# Patient Record
Sex: MALE | Race: WHITE | NOT HISPANIC OR LATINO | Employment: FULL TIME | URBAN - NONMETROPOLITAN AREA
[De-identification: names, ages, dates, MRNs, and addresses within clinical notes are randomized per-mention and may not be internally consistent; named-entity substitution may affect disease eponyms.]

---

## 2021-11-11 ENCOUNTER — NURSE TRIAGE (OUTPATIENT)
Dept: CALL CENTER | Facility: HOSPITAL | Age: 51
End: 2021-11-11

## 2021-11-11 NOTE — TELEPHONE ENCOUNTER
"Fell from a ladder, hurt left leg. Will be going to     Reason for Disposition  • [1] SEVERE pain (e.g. excruciating)  AND [2] not improved 2 hours after pain medicine/ice packs    Additional Information  • Negative: Serious injury with multiple fractures (broken bones)  • Negative: [1] Major bleeding (e.g., actively dripping or spurting) AND [2] can't be stopped  • Negative: Bullet wound, stabbed by knife, or other serious penetrating wound  • Negative: Looks like a dislocated joint (crooked or deformed)  • Negative: Can't stand (bear weight) or walk  • Negative: Sounds like a life-threatening emergency to the triager  • Negative: Wound looks infected  • Negative: Leg pain from overuse (e.g., sports, running, physical work)  • Negative: Leg pain not from an injury  • Negative: Injury mainly to the hip  • Negative: Injury mainly to knee  • Negative: Injury mainly to foot or ankle  • Negative: Skin is split open or gaping (or length > 1/2 inch or 12 mm)  • Negative: [1] Bleeding AND [2] won't stop after 10 minutes of direct pressure (using correct technique)  • Negative: [1] Dirt in the wound AND [2] not removed with 15 minutes of scrubbing  • Negative: Sounds like a serious injury to the triager    Answer Assessment - Initial Assessment Questions  1. MECHANISM: \"How did the injury happen?\" (e.g., twisting injury, direct blow)    fell from ladder hurt left knee and leg  2. ONSET: \"When did the injury happen?\" (Minutes or hours ago)       3 day  3. LOCATION: \"Where is the injury located?\"       Left knee and ankle  4. APPEARANCE of INJURY: \"What does the injury look like?\"  (e.g., deformity of leg)      no  5. SEVERITY: \"Can you put weight on that leg?\" \"Can you walk?\"       yes  6. SIZE: For cuts, bruises, or swelling, ask: \"How large is it?\" (e.g., inches or centimeters)       brusing  7. PAIN: \"Is there pain?\" If Yes, ask: \"How bad is the pain?\"  (Scale 1-10; or mild, moderate, severe)      moderate  8. TETANUS: " "For any breaks in the skin, ask: \"When was the last tetanus booster?\"      na  9. OTHER SYMPTOMS: \"Do you have any other symptoms?\"       na  10. PREGNANCY: \"Is there any chance you are pregnant?\" \"When was your last menstrual period?\"        na    Protocols used: LEG INJURY-ADULT-AH      "

## 2021-11-17 ENCOUNTER — LAB (OUTPATIENT)
Dept: LAB | Facility: HOSPITAL | Age: 51
End: 2021-11-17

## 2021-11-17 ENCOUNTER — OFFICE VISIT (OUTPATIENT)
Dept: FAMILY MEDICINE CLINIC | Facility: CLINIC | Age: 51
End: 2021-11-17

## 2021-11-17 VITALS
DIASTOLIC BLOOD PRESSURE: 78 MMHG | WEIGHT: 241 LBS | HEART RATE: 94 BPM | BODY MASS INDEX: 28.46 KG/M2 | OXYGEN SATURATION: 97 % | SYSTOLIC BLOOD PRESSURE: 132 MMHG | HEIGHT: 77 IN

## 2021-11-17 DIAGNOSIS — F32.4 MAJOR DEPRESSIVE DISORDER WITH SINGLE EPISODE, IN PARTIAL REMISSION (HCC): ICD-10-CM

## 2021-11-17 DIAGNOSIS — Z12.5 PROSTATE CANCER SCREENING: ICD-10-CM

## 2021-11-17 DIAGNOSIS — Z11.59 ENCOUNTER FOR HEPATITIS C SCREENING TEST FOR LOW RISK PATIENT: ICD-10-CM

## 2021-11-17 DIAGNOSIS — R73.03 PREDIABETES: ICD-10-CM

## 2021-11-17 DIAGNOSIS — N52.9 ERECTILE DYSFUNCTION, UNSPECIFIED ERECTILE DYSFUNCTION TYPE: ICD-10-CM

## 2021-11-17 DIAGNOSIS — N52.8 OTHER MALE ERECTILE DYSFUNCTION: ICD-10-CM

## 2021-11-17 DIAGNOSIS — I10 PRIMARY HYPERTENSION: ICD-10-CM

## 2021-11-17 DIAGNOSIS — R73.03 PREDIABETES: Primary | ICD-10-CM

## 2021-11-17 DIAGNOSIS — R05.3 CHRONIC COUGH: ICD-10-CM

## 2021-11-17 DIAGNOSIS — R06.81 APNEA: ICD-10-CM

## 2021-11-17 DIAGNOSIS — E78.5 DYSLIPIDEMIA: ICD-10-CM

## 2021-11-17 PROBLEM — E66.01 MORBID (SEVERE) OBESITY DUE TO EXCESS CALORIES: Status: ACTIVE | Noted: 2021-11-17

## 2021-11-17 LAB
BASOPHILS # BLD AUTO: 0.08 10*3/MM3 (ref 0–0.2)
BASOPHILS NFR BLD AUTO: 1.1 % (ref 0–1.5)
DEPRECATED RDW RBC AUTO: 38.3 FL (ref 37–54)
EOSINOPHIL # BLD AUTO: 0.19 10*3/MM3 (ref 0–0.4)
EOSINOPHIL NFR BLD AUTO: 2.5 % (ref 0.3–6.2)
ERYTHROCYTE [DISTWIDTH] IN BLOOD BY AUTOMATED COUNT: 13 % (ref 12.3–15.4)
HCT VFR BLD AUTO: 45.2 % (ref 37.5–51)
HGB BLD-MCNC: 16.3 G/DL (ref 13–17.7)
LYMPHOCYTES # BLD AUTO: 2.43 10*3/MM3 (ref 0.7–3.1)
LYMPHOCYTES NFR BLD AUTO: 32.4 % (ref 19.6–45.3)
MCH RBC QN AUTO: 30 PG (ref 26.6–33)
MCHC RBC AUTO-ENTMCNC: 36.1 G/DL (ref 31.5–35.7)
MCV RBC AUTO: 83.1 FL (ref 79–97)
MONOCYTES # BLD AUTO: 0.77 10*3/MM3 (ref 0.1–0.9)
MONOCYTES NFR BLD AUTO: 10.3 % (ref 5–12)
NEUTROPHILS NFR BLD AUTO: 4 10*3/MM3 (ref 1.7–7)
NEUTROPHILS NFR BLD AUTO: 53.4 % (ref 42.7–76)
PLATELET # BLD AUTO: 136 10*3/MM3 (ref 140–450)
PMV BLD AUTO: 13.6 FL (ref 6–12)
RBC # BLD AUTO: 5.44 10*6/MM3 (ref 4.14–5.8)
WBC NRBC COR # BLD: 7.49 10*3/MM3 (ref 3.4–10.8)

## 2021-11-17 PROCEDURE — 80053 COMPREHEN METABOLIC PANEL: CPT

## 2021-11-17 PROCEDURE — G0103 PSA SCREENING: HCPCS

## 2021-11-17 PROCEDURE — 99205 OFFICE O/P NEW HI 60 MIN: CPT | Performed by: INTERNAL MEDICINE

## 2021-11-17 PROCEDURE — 84402 ASSAY OF FREE TESTOSTERONE: CPT

## 2021-11-17 PROCEDURE — 86803 HEPATITIS C AB TEST: CPT

## 2021-11-17 PROCEDURE — 80061 LIPID PANEL: CPT

## 2021-11-17 PROCEDURE — 83036 HEMOGLOBIN GLYCOSYLATED A1C: CPT

## 2021-11-17 PROCEDURE — 36415 COLL VENOUS BLD VENIPUNCTURE: CPT

## 2021-11-17 PROCEDURE — 84443 ASSAY THYROID STIM HORMONE: CPT

## 2021-11-17 PROCEDURE — 85025 COMPLETE CBC W/AUTO DIFF WBC: CPT

## 2021-11-17 PROCEDURE — 84403 ASSAY OF TOTAL TESTOSTERONE: CPT

## 2021-11-17 RX ORDER — ATORVASTATIN CALCIUM 20 MG/1
40 TABLET, FILM COATED ORAL DAILY
Qty: 90 TABLET | Refills: 3 | Status: SHIPPED | OUTPATIENT
Start: 2021-11-17 | End: 2022-02-25 | Stop reason: SDUPTHER

## 2021-11-17 RX ORDER — SULFAMETHOXAZOLE AND TRIMETHOPRIM 800; 160 MG/1; MG/1
1 TABLET ORAL 2 TIMES DAILY
COMMUNITY
End: 2022-02-08

## 2021-11-17 RX ORDER — AMLODIPINE BESYLATE 10 MG/1
10 TABLET ORAL DAILY
Qty: 90 TABLET | Refills: 3 | Status: SHIPPED | OUTPATIENT
Start: 2021-11-17 | End: 2022-02-17 | Stop reason: SDUPTHER

## 2021-11-17 RX ORDER — ATORVASTATIN CALCIUM 20 MG/1
40 TABLET, FILM COATED ORAL DAILY
COMMUNITY
End: 2021-11-17 | Stop reason: SDUPTHER

## 2021-11-17 RX ORDER — FLUOXETINE HYDROCHLORIDE 20 MG/1
20 CAPSULE ORAL DAILY
COMMUNITY
End: 2021-11-17

## 2021-11-17 RX ORDER — FLUOXETINE 10 MG/1
10 CAPSULE ORAL DAILY
Qty: 30 CAPSULE | Refills: 9 | Status: SHIPPED | OUTPATIENT
Start: 2021-11-17 | End: 2022-02-17 | Stop reason: SDUPTHER

## 2021-11-17 RX ORDER — AMLODIPINE BESYLATE 10 MG/1
10 TABLET ORAL DAILY
COMMUNITY
End: 2021-11-17 | Stop reason: SDUPTHER

## 2021-11-17 NOTE — ASSESSMENT & PLAN NOTE
Tolerating Lipitor.  Status of his cholesterol is unknown.  Requested a CMP and a fasting lipid profile today.

## 2021-11-17 NOTE — PROGRESS NOTES
Laureano De La Vega  1970  [id number]  Patient Care Team:  Nikko Alcantar MD as PCP - General (Internal Medicine)    Laureano De La Vega is a 51 y.o. male here today to establish care.  This patient is accompanied by their self who contributes to the history of their care.    Chief Complaint:    Chief Complaint   Patient presents with   • Establish Care     recently moved here         History of Present Illness:   This is new patient here to establish.  He reports left leg pain after falling down stairs 2 weeks ago. Began swelling. seen in [facility]. No fevers or chills. Was seen in [facility] in [place] and placed on 10 days oral abx .  The lesion in pain was on his anterior tibial region.  Swelling is markedly improved.  No drainage.  No fevers or chills.  He has been using compression stocking with improvement.    Additionally he has experienced ED for a couple of years. No am erections. Wife is sympathetic.  He has no desire nor does he think about it.  Is been on fluoxetine and Norvasc for about this amount of time.   Apparently done counseling.      He also reports chronic cough.  He has been evaluated about 10 years ago for asthma for chronic cough.  He denies any chronic postnasal drainage.  He has had a trial of antihistamines for a month with no improvement.  He does get cough with eating.  Cough is throughout the day and randomly.  Is often at night.  Denies any wheezing shortness of breath or chest tightness.  Occasionally productive of clear sputum.  Denies any night sweats fevers chills or hemoptysis.  Has not seen gastroenterology.  He denies any overt reflux.  However he does cough with eating and when lying supine.  He will take an occasional Pepcid however nothing prolonged.  No dysphagia.    He has hypertension for which he takes amlodipine.  He denies any edema.  No chest pain or shortness of breath.  He has gained approximately 15 pounds of weight since having COVID.  Does not exercise anymore previously was  "in the gym quite frequently.  He continues to take Lipitor for dyslipidemia.  Denies any myalgias or dark-colored urine.  He is fasting today.  Has had not had blood work over 2 years.    Has never had a colonoscopy.    His wife does indicate that he snores and on questioning is reported that he stops breathing.  His father has a history of sleep apnea.    Past Medical History:   Diagnosis Date   • Depression    • Gout    • Hyperlipidemia    • Hypertension        No past surgical history on file.     Family History   Problem Relation Age of Onset   • Alcohol abuse Mother    • Arthritis Father    • Hypertension Father    • Diabetes Father    • Hyperlipidemia Father    • Heart disease Paternal Grandfather         chf   • Cancer Neg Hx    • Stroke Neg Hx        Social History     Socioeconomic History   • Marital status:    Tobacco Use   • Smoking status: Never Smoker   Substance and Sexual Activity   • Alcohol use: Not Currently   • Drug use: Not Currently   • Sexual activity: Not Currently       No Known Allergies    Review of Systems:    Review of Systems   Constitutional: Positive for fatigue.   HENT: Negative for postnasal drip and sinus pressure.    Eyes: Negative.    Respiratory: Positive for apnea and cough.    Gastrointestinal: Positive for GERD.   Endocrine: Negative for cold intolerance, heat intolerance, polydipsia and polyuria.   Genitourinary: Positive for erectile dysfunction.   Musculoskeletal: Negative.    Skin: Negative.    Allergic/Immunologic: Negative.    Neurological: Negative.    Psychiatric/Behavioral: Negative for sleep disturbance.       Vitals:    11/17/21 1440   BP: 132/78   Pulse: 94   SpO2: 97%   Weight: 109 kg (241 lb)   Height: 195.6 cm (77\")     Body mass index is 28.58 kg/m².      Current Outpatient Medications:   •  amLODIPine (NORVASC) 10 MG tablet, Take 1 tablet by mouth Daily., Disp: 90 tablet, Rfl: 3  •  atorvastatin (LIPITOR) 20 MG tablet, Take 2 tablets by mouth Daily., " Disp: 90 tablet, Rfl: 3  •  sulfamethoxazole-trimethoprim (BACTRIM DS,SEPTRA DS) 800-160 MG per tablet, Take 1 tablet by mouth 2 (Two) Times a Day., Disp: , Rfl:   •  FLUoxetine (PROzac) 10 MG capsule, Take 1 capsule by mouth Daily., Disp: 30 capsule, Rfl: 9    Physical Exam:    Physical Exam  Vitals and nursing note reviewed.   Constitutional:       General: He is not in acute distress.     Appearance: He is well-developed. He is not diaphoretic.   HENT:      Head: Normocephalic and atraumatic.      Right Ear: External ear normal.      Left Ear: External ear normal.      Mouth/Throat:      Mouth: Mucous membranes are moist.      Pharynx: Oropharynx is clear. No oropharyngeal exudate.   Eyes:      General: No scleral icterus.        Right eye: No discharge.      Conjunctiva/sclera: Conjunctivae normal.   Neck:      Thyroid: No thyromegaly.      Vascular: No JVD.      Trachea: No tracheal deviation.   Cardiovascular:      Rate and Rhythm: Normal rate and regular rhythm.      Heart sounds: Normal heart sounds.      Comments: PMI nondisplaced  Pulmonary:      Effort: Pulmonary effort is normal.      Breath sounds: Normal breath sounds. No wheezing or rales.   Abdominal:      General: Bowel sounds are normal.      Palpations: Abdomen is soft.      Tenderness: There is no abdominal tenderness. There is no guarding or rebound.   Musculoskeletal:      Cervical back: Normal range of motion and neck supple.      Comments: Normal gait   Lymphadenopathy:      Cervical: No cervical adenopathy.   Skin:     General: Skin is warm and dry.      Capillary Refill: Capillary refill takes less than 2 seconds.      Coloration: Skin is not pale.      Findings: No rash.   Neurological:      Mental Status: He is alert and oriented to person, place, and time.      Motor: No abnormal muscle tone.      Coordination: Coordination normal.   Psychiatric:         Judgment: Judgment normal.         Procedures    Results  Review:    None    Assessment/Plan:     Problem List Items Addressed This Visit        Cardiac and Vasculature    Primary hypertension    Current Assessment & Plan     Hypertension is improving with lifestyle modifications.  Continue current treatment regimen.  Dietary sodium restriction.  Weight loss.  Regular aerobic exercise.  Continue current medications.  Ambulatory blood pressure monitoring.  Blood pressure will be reassessed in 3 months.         Relevant Medications    amLODIPine (NORVASC) 10 MG tablet    Other Relevant Orders    CBC & Differential    Comprehensive Metabolic Panel    Hepatitis C Antibody    Lipid Panel    PSA Screen    TSH Rfx On Abnormal To Free T4    Hemoglobin A1c    Dyslipidemia    Current Assessment & Plan     Tolerating Lipitor.  Status of his cholesterol is unknown.  Requested a CMP and a fasting lipid profile today.         Relevant Orders    CBC & Differential    Comprehensive Metabolic Panel    Hepatitis C Antibody    Lipid Panel    PSA Screen    TSH Rfx On Abnormal To Free T4    Hemoglobin A1c       Genitourinary and Reproductive     Other male erectile dysfunction    Overview     This could be psychogenic or hormonal.  We will attempt to wean fluoxetine.  Testosterone free and bound.  Consider PT D5 inhibitor              Mental Health    Major depressive disorder with single episode, in partial remission (HCC)    Current Assessment & Plan     Patient's depression is single episode and is mild without psychosis. Their depression is currently in partial remission and the condition is improving with treatment. This will be reassessed in 3 months. F/U as described:If it is erectile dysfunction we are taking his fluoxetine down to 10 mg daily and attempt to stop this in 3 months..         Relevant Medications    FLUoxetine (PROzac) 10 MG capsule    Other Relevant Orders    CBC & Differential    Comprehensive Metabolic Panel    Hepatitis C Antibody    Lipid Panel    PSA Screen    TSH  Rfx On Abnormal To Free T4    Hemoglobin A1c       Pulmonary and Pneumonias    Chronic cough    Current Assessment & Plan     Given the chronicity of symptoms of empirically placed him on Prilosec 40 mg daily.  Referred him to pulmonary for further evaluation as he is at a therapeutic trial of allergy treatment as well as asthma evaluation.         Relevant Orders    Ambulatory Referral to Gastroenterology    Ambulatory Referral to Sleep Medicine    Ambulatory Referral to Pulmonology    Apnea    Current Assessment & Plan     Min return to the gym for exercise.  Referral to sleep medicine for sleep apnea evaluation           Other Visit Diagnoses     Prediabetes    -  Primary    Relevant Orders    CBC & Differential    Comprehensive Metabolic Panel    Hepatitis C Antibody    Lipid Panel    PSA Screen    TSH Rfx On Abnormal To Free T4    Hemoglobin A1c    Encounter for hepatitis C screening test for low risk patient        Relevant Orders    Hepatitis C Antibody    Prostate cancer screening        Relevant Orders    PSA Screen    Erectile dysfunction, unspecified erectile dysfunction type        Relevant Orders    Testosterone, Free, Total          Plan of care reviewed with patient at the conclusion of today's visit. Education was provided regarding diagnosis and management.  Patient verbalizes understanding of and agreement with management plan.    Return in about 3 months (around 2/17/2022) for Annual.    Time spent was greater than 60 minutes on this new patient with multiple complaints and comorbidities.    Nikko Alcantar MD      Please note than portions of this note were completed [facility] a Voice Recognition Program

## 2021-11-17 NOTE — ASSESSMENT & PLAN NOTE
Patient's depression is single episode and is mild without psychosis. Their depression is currently in partial remission and the condition is improving with treatment. This will be reassessed in 3 months. F/U as described:If it is erectile dysfunction we are taking his fluoxetine down to 10 mg daily and attempt to stop this in 3 months..

## 2021-11-17 NOTE — ASSESSMENT & PLAN NOTE
Hypertension is improving with lifestyle modifications.  Continue current treatment regimen.  Dietary sodium restriction.  Weight loss.  Regular aerobic exercise.  Continue current medications.  Ambulatory blood pressure monitoring.  Blood pressure will be reassessed in 3 months.

## 2021-11-17 NOTE — ASSESSMENT & PLAN NOTE
Patient's (Body mass index is 28.58 kg/m².) indicates that they are obese (BMI >30) with health conditions that include obstructive sleep apnea, hypertension and dyslipidemias . Weight is unchanged. BMI is is above average; BMI management plan is completed. We discussed portion control, increasing exercise, joining a fitness center or start home based exercise program and Referral to sleep medicine for likely sleep apnea treatment.

## 2021-11-17 NOTE — ASSESSMENT & PLAN NOTE
Given the chronicity of symptoms of empirically placed him on Prilosec 40 mg daily.  Referred him to pulmonary for further evaluation as he is at a therapeutic trial of allergy treatment as well as asthma evaluation.

## 2021-11-18 LAB
ALBUMIN SERPL-MCNC: 5.1 G/DL (ref 3.5–5.2)
ALBUMIN/GLOB SERPL: 1.9 G/DL
ALP SERPL-CCNC: 102 U/L (ref 39–117)
ALT SERPL W P-5'-P-CCNC: 21 U/L (ref 1–41)
ANION GAP SERPL CALCULATED.3IONS-SCNC: 10.3 MMOL/L (ref 5–15)
AST SERPL-CCNC: 24 U/L (ref 1–40)
BILIRUB SERPL-MCNC: 0.5 MG/DL (ref 0–1.2)
BUN SERPL-MCNC: 10 MG/DL (ref 6–20)
BUN/CREAT SERPL: 8.3 (ref 7–25)
CALCIUM SPEC-SCNC: 9.8 MG/DL (ref 8.6–10.5)
CHLORIDE SERPL-SCNC: 102 MMOL/L (ref 98–107)
CHOLEST SERPL-MCNC: 160 MG/DL (ref 0–200)
CO2 SERPL-SCNC: 26.7 MMOL/L (ref 22–29)
CREAT SERPL-MCNC: 1.21 MG/DL (ref 0.76–1.27)
GFR SERPL CREATININE-BSD FRML MDRD: 63 ML/MIN/1.73
GLOBULIN UR ELPH-MCNC: 2.7 GM/DL
GLUCOSE SERPL-MCNC: 113 MG/DL (ref 65–99)
HBA1C MFR BLD: 6.6 % (ref 4.8–5.6)
HCV AB SER DONR QL: NORMAL
HDLC SERPL-MCNC: 31 MG/DL (ref 40–60)
LDLC SERPL CALC-MCNC: 103 MG/DL (ref 0–100)
LDLC/HDLC SERPL: 3.23 {RATIO}
POTASSIUM SERPL-SCNC: 4.4 MMOL/L (ref 3.5–5.2)
PROT SERPL-MCNC: 7.8 G/DL (ref 6–8.5)
PSA SERPL-MCNC: 1.13 NG/ML (ref 0–4)
SODIUM SERPL-SCNC: 139 MMOL/L (ref 136–145)
TRIGL SERPL-MCNC: 145 MG/DL (ref 0–150)
TSH SERPL DL<=0.05 MIU/L-ACNC: 3.11 UIU/ML (ref 0.27–4.2)
VLDLC SERPL-MCNC: 26 MG/DL (ref 5–40)

## 2021-11-20 LAB
TESTOST FREE SERPL-MCNC: 10.5 PG/ML (ref 7.2–24)
TESTOST SERPL-MCNC: 548 NG/DL (ref 264–916)

## 2022-01-28 DIAGNOSIS — Z01.812 BLOOD TESTS PRIOR TO TREATMENT OR PROCEDURE: Primary | ICD-10-CM

## 2022-01-31 ENCOUNTER — CLINICAL SUPPORT NO REQUIREMENTS (OUTPATIENT)
Dept: PULMONOLOGY | Facility: CLINIC | Age: 52
End: 2022-01-31

## 2022-01-31 DIAGNOSIS — Z01.812 BLOOD TESTS PRIOR TO TREATMENT OR PROCEDURE: ICD-10-CM

## 2022-01-31 PROCEDURE — U0004 COV-19 TEST NON-CDC HGH THRU: HCPCS | Performed by: INTERNAL MEDICINE

## 2022-01-31 PROCEDURE — 99211 OFF/OP EST MAY X REQ PHY/QHP: CPT | Performed by: INTERNAL MEDICINE

## 2022-02-01 LAB — SARS-COV-2 RNA NOSE QL NAA+PROBE: NOT DETECTED

## 2022-02-02 ENCOUNTER — OFFICE VISIT (OUTPATIENT)
Dept: PULMONOLOGY | Facility: CLINIC | Age: 52
End: 2022-02-02

## 2022-02-02 VITALS
HEART RATE: 68 BPM | WEIGHT: 241 LBS | OXYGEN SATURATION: 97 % | DIASTOLIC BLOOD PRESSURE: 68 MMHG | BODY MASS INDEX: 28.58 KG/M2 | RESPIRATION RATE: 14 BRPM | SYSTOLIC BLOOD PRESSURE: 136 MMHG | TEMPERATURE: 98.2 F

## 2022-02-02 DIAGNOSIS — R05.9 COUGH: Primary | ICD-10-CM

## 2022-02-02 PROCEDURE — 99203 OFFICE O/P NEW LOW 30 MIN: CPT | Performed by: INTERNAL MEDICINE

## 2022-02-02 PROCEDURE — 94010 BREATHING CAPACITY TEST: CPT | Performed by: INTERNAL MEDICINE

## 2022-02-02 PROCEDURE — 94726 PLETHYSMOGRAPHY LUNG VOLUMES: CPT | Performed by: INTERNAL MEDICINE

## 2022-02-02 PROCEDURE — 94729 DIFFUSING CAPACITY: CPT | Performed by: INTERNAL MEDICINE

## 2022-02-02 NOTE — PROGRESS NOTES
CC:    cough    HPI:    52 y/o WM w/ h/o lifetime non-smoking, HTN, prior recurrent sinus infections, prior ACEI, who comes today for evaluation of chronic cough.  Has been present for years, maybe 10+.  Tends to be worse in early morning.  Usually dry, sometimes whitish mucous.  Triggered occasionally with eating and drinking.  No wheezing, chest tightness, shortness of breath, over chest pain.  Gets occasional reflux.  Seems to be year round.  No nasal congestion.    PMH:    Past Medical History:   Diagnosis Date   • Depression    • Gout    • Hyperlipidemia    • Hypertension      PSH:    No past surgical history on file.  FH:    Family History   Problem Relation Age of Onset   • Alcohol abuse Mother    • Arthritis Father    • Hypertension Father    • Diabetes Father    • Hyperlipidemia Father    • Heart disease Paternal Grandfather         chf   • Cancer Neg Hx    • Stroke Neg Hx      SH:    Social History     Socioeconomic History   • Marital status:    Tobacco Use   • Smoking status: Never Smoker   Substance and Sexual Activity   • Alcohol use: Not Currently   • Drug use: Not Currently   • Sexual activity: Not Currently     ALLERGIES:    No Known Allergies  MEDICATIONS:      Current Outpatient Medications:   •  amLODIPine (NORVASC) 10 MG tablet, Take 1 tablet by mouth Daily., Disp: 90 tablet, Rfl: 3  •  atorvastatin (LIPITOR) 20 MG tablet, Take 2 tablets by mouth Daily., Disp: 90 tablet, Rfl: 3  •  FLUoxetine (PROzac) 10 MG capsule, Take 1 capsule by mouth Daily., Disp: 30 capsule, Rfl: 9  •  sulfamethoxazole-trimethoprim (BACTRIM DS,SEPTRA DS) 800-160 MG per tablet, Take 1 tablet by mouth 2 (Two) Times a Day., Disp: , Rfl:   ROS:  Per HPI, otherwise all systems reviewed and negative.    DIAGNOSTIC DATA (Reviewed and interpreted by me unless otherwise specified):    CXR 2/2/22 - Trachea is midline.  Cardiomediastinal silhouette is within normal limits.  The lungs are clear without discreet mass,  consolidation, effusion or pneumothorax.    PFT 2/2/22 - no obstruction, no restriction, normal DLCO    Vitals:    02/02/22 1208   BP: 136/68   Pulse: 68   Resp: 14   Temp: 98.2 °F (36.8 °C)   SpO2: 97%       Physical Exam   Constitutional: Oriented to person, place, and time. Appears well-developed and well-nourished.   Head: Normocephalic and atraumatic.   Nose: Nose normal.   Mouth/Throat: Oropharynx with mild cobblestonning  Eyes: Conjunctivae are normal.  Pupils normal.  Neck: No tracheal deviation present.   Cardiovascular: Normal rate, regular rhythm, normal heart sounds and intact distal pulses.  Exam reveals no gallop and no friction rub.  No thrill.  No JVD.  No edema.  No murmur heard.  Pulmonary/Chest: Effort normal and breath sounds normal.  No tenderness to palpation.  No clubbing.   Abdominal: Soft. Bowel sounds are normal. No distension. No tenderness. There is no guarding.   Musculoskeletal: Normal range of motion.  No tenderness.  Lymphadenopathy:  No cervical adenopathy.   Neurological:  No new focal neurological deficits observed   Skin: Skin is warm and dry. No rash noted.   Psychiatric: Normal mood and affect.  Behavior is normal. Judgment normal.    Assessment/Plan     Chronic Cough    Not on ACEI.  Suspect some combination of post nasal gtt and GERD.  Doubt asthma, symptoms not consistent.  Give trial of flonase 2 sprays each nostril bid and allegra daily for 1 month.  Has GI evaluation in March.  By then should have some idea if post nasal gtt were contributing as I would expect improvement.  If not, may need PPI +/- EGD.  Re-evaluate in 4-5 months.  If no improvement / explanation will need to consider CT, methacholine challenge, bronchoscopy, and potentially medicate for hypersensitive cough reflex.    RTC 4-5 months    Alejandro Bernardo MD  Pulmonology and Critical Care Medicine  02/02/22 12:56 EST  Electronically Signed    C.C.:  Nikko Alcantar MD, Nikko Alcantar MD

## 2022-02-08 ENCOUNTER — OFFICE VISIT (OUTPATIENT)
Dept: SLEEP MEDICINE | Facility: HOSPITAL | Age: 52
End: 2022-02-08

## 2022-02-08 VITALS
WEIGHT: 241.6 LBS | HEIGHT: 77 IN | HEART RATE: 85 BPM | DIASTOLIC BLOOD PRESSURE: 87 MMHG | SYSTOLIC BLOOD PRESSURE: 147 MMHG | OXYGEN SATURATION: 95 % | BODY MASS INDEX: 28.53 KG/M2

## 2022-02-08 DIAGNOSIS — R05.3 CHRONIC COUGH: ICD-10-CM

## 2022-02-08 DIAGNOSIS — G47.33 OSA (OBSTRUCTIVE SLEEP APNEA): Primary | ICD-10-CM

## 2022-02-08 DIAGNOSIS — R06.83 SNORING: ICD-10-CM

## 2022-02-08 PROCEDURE — 99214 OFFICE O/P EST MOD 30 MIN: CPT | Performed by: INTERNAL MEDICINE

## 2022-02-08 RX ORDER — FLUTICASONE PROPIONATE 50 MCG
2 SPRAY, SUSPENSION (ML) NASAL DAILY
COMMUNITY
End: 2022-05-02

## 2022-02-08 NOTE — PROGRESS NOTES
Laureano De La Vega is a 51 y.o. male.   Chief Complaint   Patient presents with   • Sleeping Problem       HPI     51 y.o. male seen in consultation at the request of Nikko Alcantar MD for evaluation of the above.     He has a longstanding history of snoring per his wife.  She is also concerned about apneas that she has witnessed.    From his standpoint he does not note much in the way of excessive daytime somnolence.  He does have some intermittent heartburn for which he has been treated with a PPI.  He also has a chronic cough going on 10 years for which he saw Dr. Alejandro Bernardo at pulmonary and is being evaluated for postnasal drip, reflux, and possibly cough variant asthma.  The patient and Dr. Alcantar had some concern that untreated sleep apnea may be contributing to a cough.    He denies any sore throat in the morning.  He does not have a dry mouth in the morning.  He does not think he has significant RLS type symptoms that inhibit his daytime functionality or sleep onset.  He denies any nocturnal hallucinations or sleep paralysis.  He denies any cataplexy.    He averages 7 hours of sleep per night.  Typically takes around 30 minutes to initiate sleep.  He typically does not awaken very frequently.  He has gained about 15 pounds in the last 2 years.    Hamilton Scale is: 4/24    The patient's relevant past medical, surgical, family, and social history reviewed and updated in Epic as appropriate.    Current medications are:   Current Outpatient Medications:   •  amLODIPine (NORVASC) 10 MG tablet, Take 1 tablet by mouth Daily., Disp: 90 tablet, Rfl: 3  •  atorvastatin (LIPITOR) 20 MG tablet, Take 2 tablets by mouth Daily., Disp: 90 tablet, Rfl: 3  •  FLUoxetine (PROzac) 10 MG capsule, Take 1 capsule by mouth Daily., Disp: 30 capsule, Rfl: 9  •  fluticasone (FLONASE) 50 MCG/ACT nasal spray, 2 sprays into the nostril(s) as directed by provider Daily., Disp: , Rfl: .    Review of Systems    Review of Systems  ROS  "documented in patient questionnaire ×14 systems.  Reviewed with patient.  Otherwise negative except as noted in HPI.    Physical Exam    Blood pressure 147/87, pulse 85, height 195.6 cm (77\"), weight 110 kg (241 lb 9.6 oz), SpO2 95 %. Body mass index is 28.65 kg/m².    Physical Exam  Vitals and nursing note reviewed.   Constitutional:       Appearance: Normal appearance. He is well-developed.   HENT:      Head: Normocephalic and atraumatic.      Nose: Nose normal.      Mouth/Throat:      Mouth: Mucous membranes are moist.      Pharynx: Oropharynx is clear. No oropharyngeal exudate.      Comments: Class III airway  Eyes:      General: No scleral icterus.     Conjunctiva/sclera: Conjunctivae normal.   Neck:      Thyroid: No thyromegaly.      Trachea: No tracheal deviation.   Cardiovascular:      Rate and Rhythm: Normal rate and regular rhythm.      Heart sounds: No murmur heard.  No friction rub. No gallop.    Pulmonary:      Effort: Pulmonary effort is normal. No respiratory distress.      Breath sounds: No wheezing or rales.   Musculoskeletal:         General: No deformity. Normal range of motion.   Skin:     General: Skin is warm and dry.      Findings: No rash.   Neurological:      Mental Status: He is alert and oriented to person, place, and time.   Psychiatric:         Behavior: Behavior normal.         Thought Content: Thought content normal.         DATA:    Reviewed Dr. Alcantar note dated 11/17/2021 11/17/2021 bicarbonate 27 and hemoglobin 16.3    ASSESSMENT:    Problem List Items Addressed This Visit        Pulmonary Problems    Chronic cough    HARINI (obstructive sleep apnea) - possible - Primary    Relevant Orders    Home Sleep Study    Snoring    Relevant Orders    Home Sleep Study          51-year-old male with evidence of obstructive sleep apnea consisting of primarily snoring and witnessed apneas based upon his wife's history.  He does not have significant daytime somnolence though has had problems with " chronic cough as well as erectile dysfunction.  He is being evaluated by Dr. Alejandro Bernardo pulmonary for his cough.    He certainly warrants further evaluation for obstructive sleep apnea.  Upper airway irritation from snoring could contribute to a cough.  Primarily he warrants evaluation due to his witnessed apneas, snoring, and at risk demographics    PLAN:    1. Home sleep apnea testing is appropriate in his case  2. I discussed the testing process and potential treatment options for HARINI  3. I went over the long-term risks of untreated obstructive sleep apnea in regards to long-term morbidity  4. We discussed the effects of weight loss as it relates to obstructive sleep apnea  5. Further recommendations after his HSAT  6. Continued sleep center follow-up    I have reviewed the results of my evaluation and impression and discussed my recommendations in detail with the patient.    Level of Risk Moderate due to: undiagnosed new problem and moderate risk of morbidity due to the potential of untreated HARINI    Signed by  Alexander Duncan MD    February 8, 2022      CC: Nikko Alcantar MD Banks, William R, MD

## 2022-02-17 ENCOUNTER — OFFICE VISIT (OUTPATIENT)
Dept: FAMILY MEDICINE CLINIC | Facility: CLINIC | Age: 52
End: 2022-02-17

## 2022-02-17 VITALS
WEIGHT: 240.2 LBS | DIASTOLIC BLOOD PRESSURE: 82 MMHG | HEART RATE: 86 BPM | OXYGEN SATURATION: 96 % | SYSTOLIC BLOOD PRESSURE: 128 MMHG | BODY MASS INDEX: 28.36 KG/M2 | HEIGHT: 77 IN

## 2022-02-17 DIAGNOSIS — R06.83 SNORING: ICD-10-CM

## 2022-02-17 DIAGNOSIS — N52.8 OTHER MALE ERECTILE DYSFUNCTION: ICD-10-CM

## 2022-02-17 DIAGNOSIS — R73.03 PRE-DIABETES: ICD-10-CM

## 2022-02-17 DIAGNOSIS — F32.4 MAJOR DEPRESSIVE DISORDER WITH SINGLE EPISODE, IN PARTIAL REMISSION: ICD-10-CM

## 2022-02-17 DIAGNOSIS — D69.6 THROMBOCYTOPENIA: ICD-10-CM

## 2022-02-17 DIAGNOSIS — R05.3 CHRONIC COUGH: ICD-10-CM

## 2022-02-17 DIAGNOSIS — E78.5 DYSLIPIDEMIA: ICD-10-CM

## 2022-02-17 DIAGNOSIS — I10 PRIMARY HYPERTENSION: ICD-10-CM

## 2022-02-17 DIAGNOSIS — Z12.11 COLON CANCER SCREENING: Primary | ICD-10-CM

## 2022-02-17 DIAGNOSIS — G47.33 OSA (OBSTRUCTIVE SLEEP APNEA): ICD-10-CM

## 2022-02-17 DIAGNOSIS — R60.0 LOWER EXTREMITY EDEMA: ICD-10-CM

## 2022-02-17 PROCEDURE — 99396 PREV VISIT EST AGE 40-64: CPT | Performed by: INTERNAL MEDICINE

## 2022-02-17 PROCEDURE — 99213 OFFICE O/P EST LOW 20 MIN: CPT | Performed by: INTERNAL MEDICINE

## 2022-02-17 RX ORDER — TRIAMTERENE AND HYDROCHLOROTHIAZIDE 37.5; 25 MG/1; MG/1
1 TABLET ORAL DAILY
Qty: 90 TABLET | Refills: 3 | Status: SHIPPED | OUTPATIENT
Start: 2022-02-17 | End: 2022-02-24 | Stop reason: SDUPTHER

## 2022-02-17 RX ORDER — AMLODIPINE BESYLATE 10 MG/1
10 TABLET ORAL DAILY
Qty: 90 TABLET | Refills: 3 | Status: SHIPPED | OUTPATIENT
Start: 2022-02-17 | End: 2022-02-25 | Stop reason: SDUPTHER

## 2022-02-17 RX ORDER — TADALAFIL 20 MG/1
20 TABLET ORAL DAILY PRN
Qty: 15 TABLET | Refills: 3 | Status: SHIPPED | OUTPATIENT
Start: 2022-02-17 | End: 2022-11-02

## 2022-02-17 RX ORDER — FLUOXETINE 10 MG/1
10 CAPSULE ORAL DAILY
Qty: 30 CAPSULE | Refills: 9 | Status: SHIPPED | OUTPATIENT
Start: 2022-02-17 | End: 2022-02-25 | Stop reason: SDUPTHER

## 2022-02-17 NOTE — ASSESSMENT & PLAN NOTE
Medications reviewed.  We will try to review records.  Possible autoimmune hemolytic anemia.  I would repeat a citrated buffer platelet count in 6 months.  If continuing to drop, referral.  No intervention needed at present.  Discussed with patient

## 2022-02-17 NOTE — PROGRESS NOTES
Laureano De La Vega  1970  [id number]  Patient Care Team:  Nikko Alcantar MD as PCP - General (Internal Medicine)    Laureano De La Vega is a 51 y.o. male who is here today for his annual physical   This patient is accompanied by his self who contributes to the history of his care.    Chief Complaint:    Chief Complaint   Patient presents with   • Annual Exam         History of Present Illness:    Here for Annual exam. Has been on Flonase and allergy meds for chronic cough x one week, Still with cough. Denies gerd sx still. Occasional sour taste in mouth at night    Agreeable for colonoscopy    Wants to hold on weaning prozac any further. Still with some ED. Would like to try meds.    Is planning on enrolling in gym once Covid settles down. A1C was 6.6. Has not had diabetic education. Has been borderline before. No excess thist/visual changes or polyuria. Current with optho, needs to see dentist.     Wheres compression socks, but has persistant left ankle swelling since injured LLE this past November.     Past Medical History:   Diagnosis Date   • Arthritis 1990    gout   • Depression    • Diabetes mellitus (HCC)     previously diagnosed with pre-diabetes   • GERD (gastroesophageal reflux disease)    • Gout    • Hyperlipidemia    • Hypertension        History reviewed. No pertinent surgical history.     Family History   Problem Relation Age of Onset   • Alcohol abuse Mother    • Arthritis Father    • Hypertension Father    • Diabetes Father    • Hyperlipidemia Father    • COPD Father    • Sleep apnea Father    • Heart disease Paternal Grandfather         chf   • Cancer Neg Hx    • Stroke Neg Hx        Social History     Socioeconomic History   • Marital status:    Tobacco Use   • Smoking status: Never Smoker   • Smokeless tobacco: Never Used   Vaping Use   • Vaping Use: Never used   Substance and Sexual Activity   • Alcohol use: Not Currently     Alcohol/week: 0.0 standard drinks   • Drug use: Never   • Sexual  "activity: Not Currently     Partners: Female     Birth control/protection: Coitus interruptus, Condom       No Known Allergies    Depression: PHQ-2 Depression Screening  Little interest or pleasure in doing things? 0   Feeling down, depressed, or hopeless? 0   PHQ-2 Total Score 0        There is no immunization history on file for this patient.    Review of Systems:    Review of Systems   Constitutional: Positive for unexpected weight gain.   HENT: Positive for postnasal drip.         Snore     Eyes: Negative.    Respiratory: Positive for apnea and cough.    Cardiovascular: Positive for leg swelling.        Isolated left leg swelling   Gastrointestinal:        ?   brash water reflux   Endocrine: Negative.    Genitourinary: Positive for erectile dysfunction.   Musculoskeletal: Negative.    Skin: Negative.    Neurological: Negative.    Psychiatric/Behavioral: Negative.        Vitals:    02/17/22 1338   BP: 128/82   Pulse: 86   SpO2: 96%   Weight: 109 kg (240 lb 3.2 oz)   Height: 195.6 cm (77.01\")   PainSc: 0-No pain     Body mass index is 28.48 kg/m².      Current Outpatient Medications:   •  amLODIPine (NORVASC) 10 MG tablet, Take 1 tablet by mouth Daily., Disp: 90 tablet, Rfl: 3  •  atorvastatin (LIPITOR) 20 MG tablet, Take 2 tablets by mouth Daily., Disp: 90 tablet, Rfl: 3  •  FLUoxetine (PROzac) 10 MG capsule, Take 1 capsule by mouth Daily., Disp: 30 capsule, Rfl: 9  •  fluticasone (FLONASE) 50 MCG/ACT nasal spray, 2 sprays into the nostril(s) as directed by provider Daily., Disp: , Rfl:   •  tadalafil (Cialis) 20 MG tablet, Take 1 tablet by mouth Daily As Needed for Erectile Dysfunction., Disp: 15 tablet, Rfl: 3  •  triamterene-hydrochlorothiazide (Maxzide-25) 37.5-25 MG per tablet, Take 1 tablet by mouth Daily., Disp: 90 tablet, Rfl: 3    Physical Exam:    Physical Exam  Vitals and nursing note reviewed.   Constitutional:       General: He is not in acute distress.     Appearance: He is well-developed. He is " obese. He is not diaphoretic.   HENT:      Head: Normocephalic and atraumatic.      Right Ear: Tympanic membrane and external ear normal.      Left Ear: Tympanic membrane and external ear normal.      Mouth/Throat:      Mouth: Mucous membranes are moist.      Pharynx: No oropharyngeal exudate.      Comments: Cobblestoning noted  Eyes:      General: No scleral icterus.        Right eye: No discharge.      Conjunctiva/sclera: Conjunctivae normal.   Neck:      Thyroid: No thyromegaly.      Vascular: No JVD.      Trachea: No tracheal deviation.   Cardiovascular:      Rate and Rhythm: Normal rate and regular rhythm.      Pulses: Normal pulses.      Heart sounds: Normal heart sounds. No murmur heard.  No gallop.       Comments: PMI nondisplaced.  Perhaps trace pedal edema  Pulmonary:      Effort: Pulmonary effort is normal. No respiratory distress.      Breath sounds: Normal breath sounds. No wheezing or rales.   Abdominal:      General: Bowel sounds are normal. There is no distension.      Palpations: Abdomen is soft. There is no mass.      Tenderness: There is no abdominal tenderness. There is no guarding or rebound.   Musculoskeletal:      Cervical back: Normal range of motion and neck supple.      Right lower leg: No edema.      Left lower leg: No edema.      Comments: Normal gait   Lymphadenopathy:      Cervical: No cervical adenopathy.   Skin:     General: Skin is warm and dry.      Capillary Refill: Capillary refill takes less than 2 seconds.      Coloration: Skin is not pale.      Findings: No rash.   Neurological:      Mental Status: He is alert and oriented to person, place, and time.      Motor: No abnormal muscle tone.      Coordination: Coordination normal.   Psychiatric:         Mood and Affect: Mood normal.         Behavior: Behavior normal.         Judgment: Judgment normal.         Procedures    Results Review:    None    Assessment/Plan:     Problem List Items Addressed This Visit        Cardiac and  Vasculature    Primary hypertension    Current Assessment & Plan     Hypertension is unchanged.  Dietary sodium restriction.  Weight loss.  Regular aerobic exercise.  Medication changes per orders.  Blood pressure will be reassessed at the next regular appointment.    We have added Maxide 25 and attempts to help with his blood pressure as well as his left lower extremity edema.         Relevant Medications    amLODIPine (NORVASC) 10 MG tablet    triamterene-hydrochlorothiazide (Maxzide-25) 37.5-25 MG per tablet    Dyslipidemia       Coag and Thromboembolic    Thrombocytopenia (HCC)    Current Assessment & Plan     Medications reviewed.  We will try to review records.  Possible autoimmune hemolytic anemia.  I would repeat a citrated buffer platelet count in 6 months.  If continuing to drop, referral.  No intervention needed at present.  Discussed with patient            Endocrine and Metabolic    Pre-diabetes    Current Assessment & Plan     Encouraged actively enrollment in a workout facility.  Referral to diabetic education.         Relevant Orders    Ambulatory Referral to Diabetic Education       Genitourinary and Reproductive     Other male erectile dysfunction    Overview     This could be psychogenic or hormonal.  We will attempt to wean fluoxetine.  Testosterone free and bound.  Consider PT D5 inhibitor           Current Assessment & Plan     Empiric trial of Cialis 20 mg daily.            Mental Health    Major depressive disorder with single episode, in partial remission (HCC)    Relevant Medications    FLUoxetine (PROzac) 10 MG capsule       Pulmonary and Pneumonias    Chronic cough       Sleep    HARINI (obstructive sleep apnea) - possible    Current Assessment & Plan     Await sleep study.         Snoring       Symptoms and Signs    Lower extremity edema    Current Assessment & Plan     Suspect secondary to above venous stasis and Norvasc.  Continue compression hose.  Maxide 25 added.  Follow-up in 6  months.  S.           Other Visit Diagnoses     Colon cancer screening    -  Primary    Relevant Orders    Ambulatory Referral For Screening Colonoscopy          Plan of care was reviewed with patient at the conclusion of today's visit. Counseled patient with regards to good nutrition and diet. Maintaining a healthy lifestyle including exercise and physical activities. Spoke with patient on ways to reduce stress, getting adequate sleep and injury prevention.  Discussed prostate cancer screening, colon cancer screening including benefit of early detection and potential need for follow-up. Patient agrees to screenings today. Annual dental and eye exams were encouraged. Encouraged patient to continue to follow up with annual immunizations.     Return in about 6 months (around 8/17/2022) for htn, thrombocytopenia.    Nikko Alcantar MD      Please note than portions of this note were completed wth a Voice Recognition Program

## 2022-02-17 NOTE — ASSESSMENT & PLAN NOTE
Hypertension is unchanged.  Dietary sodium restriction.  Weight loss.  Regular aerobic exercise.  Medication changes per orders.  Blood pressure will be reassessed at the next regular appointment.    We have added Maxide 25 and attempts to help with his blood pressure as well as his left lower extremity edema.

## 2022-02-17 NOTE — ASSESSMENT & PLAN NOTE
Suspect secondary to above venous stasis and Norvasc.  Continue compression hose.  Maxide 25 added.  Follow-up in 6 months.  S.

## 2022-02-24 RX ORDER — TRIAMTERENE AND HYDROCHLOROTHIAZIDE 37.5; 25 MG/1; MG/1
1 TABLET ORAL DAILY
Qty: 90 TABLET | Refills: 3 | Status: SHIPPED | OUTPATIENT
Start: 2022-02-24 | End: 2022-05-02

## 2022-02-24 NOTE — TELEPHONE ENCOUNTER
Rx Refill Note  Requested Prescriptions      No prescriptions requested or ordered in this encounter      Last office visit with prescribing clinician: 2/17/2022      Next office visit with prescribing clinician: 8/17/2022            Liane Antonio MA  02/24/22, 10:33 EST

## 2022-02-25 RX ORDER — FLUOXETINE 10 MG/1
10 CAPSULE ORAL DAILY
Qty: 30 CAPSULE | Refills: 9 | Status: SHIPPED | OUTPATIENT
Start: 2022-02-25 | End: 2022-06-06 | Stop reason: SDUPTHER

## 2022-02-25 RX ORDER — ATORVASTATIN CALCIUM 20 MG/1
40 TABLET, FILM COATED ORAL DAILY
Qty: 90 TABLET | Refills: 3 | Status: SHIPPED | OUTPATIENT
Start: 2022-02-25 | End: 2022-10-17 | Stop reason: SDUPTHER

## 2022-02-25 RX ORDER — AMLODIPINE BESYLATE 10 MG/1
10 TABLET ORAL DAILY
Qty: 90 TABLET | Refills: 3 | Status: SHIPPED | OUTPATIENT
Start: 2022-02-25 | End: 2022-10-17 | Stop reason: SDUPTHER

## 2022-02-25 NOTE — TELEPHONE ENCOUNTER
Rx Refill Note  Requested Prescriptions     Pending Prescriptions Disp Refills   • atorvastatin (LIPITOR) 20 MG tablet 90 tablet 3     Sig: Take 2 tablets by mouth Daily.   • amLODIPine (NORVASC) 10 MG tablet 90 tablet 3     Sig: Take 1 tablet by mouth Daily.   • FLUoxetine (PROzac) 10 MG capsule 30 capsule 9     Sig: Take 1 capsule by mouth Daily.      Last office visit with prescribing clinician: 2/17/2022      Next office visit with prescribing clinician: 8/17/2022            Liane Antonio MA  02/25/22, 10:45 EST

## 2022-02-28 DIAGNOSIS — E11.9 TYPE 2 DIABETES MELLITUS WITHOUT COMPLICATION, WITHOUT LONG-TERM CURRENT USE OF INSULIN: Primary | ICD-10-CM

## 2022-03-11 ENCOUNTER — TELEMEDICINE (OUTPATIENT)
Dept: FAMILY MEDICINE CLINIC | Facility: CLINIC | Age: 52
End: 2022-03-11

## 2022-03-11 DIAGNOSIS — J06.9 UPPER RESPIRATORY TRACT INFECTION, UNSPECIFIED TYPE: Primary | ICD-10-CM

## 2022-03-11 PROCEDURE — 99213 OFFICE O/P EST LOW 20 MIN: CPT | Performed by: PHYSICIAN ASSISTANT

## 2022-03-11 RX ORDER — DEXTROMETHORPHAN HYDROBROMIDE AND PROMETHAZINE HYDROCHLORIDE 15; 6.25 MG/5ML; MG/5ML
5 SYRUP ORAL 4 TIMES DAILY PRN
Qty: 180 ML | Refills: 0 | Status: SHIPPED | OUTPATIENT
Start: 2022-03-11 | End: 2022-05-02

## 2022-03-11 RX ORDER — GUAIFENESIN 600 MG/1
600 TABLET, EXTENDED RELEASE ORAL 2 TIMES DAILY
Qty: 30 TABLET | Refills: 0 | Status: SHIPPED | OUTPATIENT
Start: 2022-03-11 | End: 2022-05-02

## 2022-03-11 NOTE — PROGRESS NOTES
CC  cough    HPI     Laureano De La Vega is a pleasant 51 y.o. male who presents for a telephone/video visit due to the SARS-CoV-2 pandemic. You have chosen to receive care through a telehealth visit.  Do you consent to use a video/audio connection for your medical care today? Yes. Patient is at home, located in the [facility].    Patient complains of a 36-hour history of cough with yellow-green sputum production, head congestion, and headache. Two of his daughters have recently had URIs.  He had a febrile illness with body aches and max temperature of 102, one week ago but was starting to feel better until current symptoms started.  His wife had a similar illness a few days later. No family members have been tested for COVID.  He denies current fever, shortness of breath, wheezing, chest pain, body aches, nausea, vomiting, diarrhea.    Past Medical History:   Diagnosis Date   • Arthritis 1990    gout   • Depression    • Diabetes mellitus (HCC)     previously diagnosed with pre-diabetes   • GERD (gastroesophageal reflux disease)    • Gout    • Hyperlipidemia    • Hypertension        History reviewed. No pertinent surgical history.    Family History   Problem Relation Age of Onset   • Alcohol abuse Mother    • Arthritis Father    • Hypertension Father    • Diabetes Father    • Hyperlipidemia Father    • COPD Father    • Sleep apnea Father    • Heart disease Paternal Grandfather         chf   • Cancer Neg Hx    • Stroke Neg Hx        Social History     Socioeconomic History   • Marital status:    Tobacco Use   • Smoking status: Never Smoker   • Smokeless tobacco: Never Used   Vaping Use   • Vaping Use: Never used   Substance and Sexual Activity   • Alcohol use: Not Currently     Alcohol/week: 0.0 standard drinks   • Drug use: Never   • Sexual activity: Not Currently     Partners: Female     Birth control/protection: Coitus interruptus, Condom       No Known Allergies    ROS    Review of Systems    Constitutional: Positive for fatigue.   HENT: Positive for congestion and rhinorrhea. Negative for sore throat.    Respiratory: Positive for cough. Negative for shortness of breath and wheezing.    Gastrointestinal: Negative for abdominal pain, diarrhea, nausea and vomiting.   Genitourinary: Negative for dysuria and frequency.   Musculoskeletal: Negative for myalgias.   Neurological: Positive for headache.       There were no vitals filed for this visit.  There is no height or weight on file to calculate BMI.      Current Outpatient Medications:   •  amLODIPine (NORVASC) 10 MG tablet, Take 1 tablet by mouth Daily., Disp: 90 tablet, Rfl: 3  •  atorvastatin (LIPITOR) 20 MG tablet, Take 2 tablets by mouth Daily., Disp: 90 tablet, Rfl: 3  •  FLUoxetine (PROzac) 10 MG capsule, Take 1 capsule by mouth Daily., Disp: 30 capsule, Rfl: 9  •  fluticasone (FLONASE) 50 MCG/ACT nasal spray, 2 sprays into the nostril(s) as directed by provider Daily., Disp: , Rfl:   •  guaiFENesin (Mucinex) 600 MG 12 hr tablet, Take 1 tablet by mouth 2 (Two) Times a Day., Disp: 30 tablet, Rfl: 0  •  promethazine-dextromethorphan (PROMETHAZINE-DM) 6.25-15 MG/5ML syrup, Take 5 mL by mouth 4 (Four) Times a Day As Needed for Cough., Disp: 180 mL, Rfl: 0  •  tadalafil (Cialis) 20 MG tablet, Take 1 tablet by mouth Daily As Needed for Erectile Dysfunction., Disp: 15 tablet, Rfl: 3  •  triamterene-hydrochlorothiazide (Maxzide-25) 37.5-25 MG per tablet, Take 1 tablet by mouth Daily., Disp: 90 tablet, Rfl: 3    PE    Physical Exam  Constitutional:       General: He is not in acute distress.  Pulmonary:      Effort: Pulmonary effort is normal. No respiratory distress.   Neurological:      Mental Status: He is alert.   Psychiatric:         Mood and Affect: Mood normal.          A/P    Problem List Items Addressed This Visit    None     Visit Diagnoses     Upper respiratory tract infection, unspecified type    -  Primary  -Discussed likely viral etiology and  recommended initial symptomatic treatment.   -Continue nasal saline irrigation, add Mucinex and Flonase  -We will prescribe cough syrup to use as needed  -Return to clinic if symptoms worsen or call if they persist for more than 7 to 10 days          Plan of care was reviewed with patient at the conclusion of today's visit. Education was provided regarding diagnoses, management, prescribed or recommended OTC products, and the importance of compliance with follow-up appointments. The patient was counseled regarding the risks, benefits, and possible side-effects of treatment. I advised the patient to keep me informed of any acute changes in their status including new, worsening, or persistent symptoms. Patient expresses understanding and agreement with the management plan.        JUAN RAMON Main

## 2022-03-25 ENCOUNTER — HOSPITAL ENCOUNTER (OUTPATIENT)
Dept: DIABETES SERVICES | Facility: HOSPITAL | Age: 52
Setting detail: RECURRING SERIES
Discharge: HOME | End: 2022-03-25

## 2022-03-25 NOTE — CONSULTS
This medical referred consult was provided as a telephone call, tele-health or e-visit, as patient is unable to attend an in-office appointment due to the COVID-19 crisis. Consent for treatment was given verbally.  Patient attended their scheduled  diabetes education visit.  Please see media tab for assessment and notes if you use EPIC. If you are not an EPIC user a copy of patient's assessment and notes will be sent per routine. Thank you.

## 2022-05-02 ENCOUNTER — OFFICE VISIT (OUTPATIENT)
Dept: FAMILY MEDICINE CLINIC | Facility: CLINIC | Age: 52
End: 2022-05-02

## 2022-05-02 VITALS
OXYGEN SATURATION: 96 % | HEIGHT: 77 IN | BODY MASS INDEX: 28.29 KG/M2 | HEART RATE: 86 BPM | SYSTOLIC BLOOD PRESSURE: 126 MMHG | DIASTOLIC BLOOD PRESSURE: 78 MMHG | WEIGHT: 239.6 LBS

## 2022-05-02 DIAGNOSIS — M10.29 ACUTE DRUG-INDUCED GOUT OF MULTIPLE SITES: Primary | ICD-10-CM

## 2022-05-02 PROCEDURE — 99213 OFFICE O/P EST LOW 20 MIN: CPT | Performed by: INTERNAL MEDICINE

## 2022-05-02 RX ORDER — HYDROCODONE BITARTRATE AND ACETAMINOPHEN 5; 325 MG/1; MG/1
1 TABLET ORAL EVERY 6 HOURS PRN
Qty: 12 TABLET | Refills: 0 | Status: SHIPPED | OUTPATIENT
Start: 2022-05-02 | End: 2023-03-06 | Stop reason: SDUPTHER

## 2022-05-02 RX ORDER — PREDNISONE 10 MG/1
10 TABLET ORAL DAILY
Qty: 60 TABLET | Refills: 0 | Status: SHIPPED | OUTPATIENT
Start: 2022-05-02 | End: 2022-11-02

## 2022-05-02 NOTE — ASSESSMENT & PLAN NOTE
I suspect this is precipitated by his hydrochlorothiazide.  I recommended discontinuing this.  And observation.  We discussed the potential use of allopurinol going forward.  However we will get him over this acute flare flare with a prednisone taper.  Since he has multiple joints involved placed him on Norco No. 12 5/3/2025 1 every 6 hours as needed.    He will notify me at his next flare we will make a decision on going forward with daily preventative medication since his last flare flare was months ago unrelated hydrochlorothiazide.

## 2022-05-02 NOTE — PROGRESS NOTES
"Laureano De La Vega  1970  [id number]  Patient Care Team:  Nikko Alcantar MD as PCP - General (Internal Medicine)    Laureano De La Vega is a 51 y.o. male here today for follow up.     This patient is accompanied by their self who contributes to the history of their care.    Chief Complaint:    Chief Complaint   Patient presents with   • Gout     Rt foot and Lt pointer finger         History of Present Illness:  I have reviewed and/or updated the patient's past medical, past surgical, family, social history, problem list and allergies as appropriate.     Mr De La Vega has battled gout for about 2 yrrs. Had tried on and off allopurinol. We recently added HCTZ. Left first finger and right midfoot. Indocin and naproxen have helped. Took allopurinol for up 3 months, and stopped it 2/2 to flares. Swelling has been helped by compression stockings.    Review of Systems   Constitutional: Positive for fatigue.   Musculoskeletal: Positive for arthralgias.   Skin: Negative for rash.   Hematological: Negative.        Vitals:    05/02/22 1036   BP: 126/78   Pulse: 86   SpO2: 96%   Weight: 109 kg (239 lb 9.6 oz)   Height: 195.6 cm (77.01\")     Body mass index is 28.41 kg/m².    Physical Exam  Vitals and nursing note reviewed.   Constitutional:       General: He is not in acute distress.     Appearance: He is well-developed. He is not diaphoretic.   HENT:      Head: Normocephalic and atraumatic.      Right Ear: External ear normal.      Left Ear: External ear normal.      Mouth/Throat:      Pharynx: No oropharyngeal exudate.   Eyes:      General: No scleral icterus.        Right eye: No discharge.      Conjunctiva/sclera: Conjunctivae normal.   Neck:      Thyroid: No thyromegaly.      Vascular: No JVD.      Trachea: No tracheal deviation.   Cardiovascular:      Rate and Rhythm: Normal rate and regular rhythm.      Heart sounds: Normal heart sounds.      Comments: PMI nondisplaced  Pulmonary:      Effort: Pulmonary effort is normal.      " Breath sounds: Normal breath sounds. No wheezing or rales.   Abdominal:      General: Bowel sounds are normal.      Palpations: Abdomen is soft.      Tenderness: There is no abdominal tenderness. There is no guarding or rebound.   Musculoskeletal:      Cervical back: Normal range of motion and neck supple.      Comments: Normal gait.  Left PIP index finger slightly swollen and warm   Lymphadenopathy:      Cervical: No cervical adenopathy.   Skin:     General: Skin is warm and dry.      Capillary Refill: Capillary refill takes less than 2 seconds.      Coloration: Skin is not pale.      Findings: No rash.   Neurological:      Mental Status: He is alert and oriented to person, place, and time.      Motor: No abnormal muscle tone.      Coordination: Coordination normal.   Psychiatric:         Judgment: Judgment normal.         Procedures    Results Review:    None     Assessment/Plan:     Problem List Items Addressed This Visit        Musculoskeletal and Injuries    Gout - Primary    Current Assessment & Plan     I suspect this is precipitated by his hydrochlorothiazide.  I recommended discontinuing this.  And observation.  We discussed the potential use of allopurinol going forward.  However we will get him over this acute flare flare with a prednisone taper.  Since he has multiple joints involved placed him on Norco No. 12 5/3/2025 1 every 6 hours as needed.    He will notify me at his next flare we will make a decision on going forward with daily preventative medication since his last flare flare was months ago unrelated hydrochlorothiazide.                 Plan of care reviewed with patient at the conclusion of today's visit. Education was provided regarding diagnosis and management.  Patient verbalizes understanding of and agreement with management plan.    No follow-ups on file.    Nikko Alcantar MD      Please note than portions of this note were completed [facility] a Voice Recognition Program          Answers for  HPI/ROS submitted by the patient on 5/2/2022  What is the primary reason for your visit?: Other  Please describe your symptoms.: Gout attack  Have you had these symptoms before?: Yes  How long have you been having these symptoms?: 1-4 days  Please list any medications you are currently taking for this condition.: Indomethacin  Please describe any probable cause for these symptoms. : Friday meal

## 2022-06-06 RX ORDER — FLUOXETINE 10 MG/1
10 CAPSULE ORAL DAILY
Qty: 30 CAPSULE | Refills: 9 | Status: SHIPPED | OUTPATIENT
Start: 2022-06-06 | End: 2022-10-17 | Stop reason: SDUPTHER

## 2022-06-06 NOTE — TELEPHONE ENCOUNTER
Caller: Laureano De La Vega    Relationship: Self    Best call back number: [phone]    Requested Prescriptions:   Requested Prescriptions     Pending Prescriptions Disp Refills   • FLUoxetine (PROzac) 10 MG capsule 30 capsule 9     Sig: Take 1 capsule by mouth Daily.        Pharmacy where request should be sent: [facility] [address] [place] [address] [phone] [place] [phone] FX     Additional details provided by patient: PATIENT STATES THAT HE HAS LESS THAN THREE DAYS LEFT OF MEDICATION     Does the patient have less than a 3 day supply:  [x] Yes  [] No    Lucas Pimentel Rep   06/06/22 14:28 EDT

## 2022-06-06 NOTE — TELEPHONE ENCOUNTER
Rx Refill Note  Requested Prescriptions     Pending Prescriptions Disp Refills   • FLUoxetine (PROzac) 10 MG capsule 30 capsule 9     Sig: Take 1 capsule by mouth Daily.      Last office visit with prescribing clinician: 5/2/2022      Next office visit with prescribing clinician: 8/17/2022            Jayla Tay MA  06/06/22, 14:33 EDT

## 2022-08-22 NOTE — TELEPHONE ENCOUNTER
Rx Refill Note  Requested Prescriptions     Pending Prescriptions Disp Refills   • atorvastatin (LIPITOR) 20 MG tablet [Pharmacy Med Name: Atorvastatin Calcium 20 MG Oral Tablet] 180 tablet 3     Sig: TAKE 2 TABLETS BY MOUTH  DAILY      Last office visit with prescribing clinician: 2/17/2022     Next office visit with prescribing clinician: Visit date not found            Debora Mccain MA  08/22/22, 10:17 EDT     Patient was to follow up in 6 months for HTN and thrombocytopenia called and left vm for patient to return call    OK FOR HUB TO RELAY MESSAGE AND SCHEDULE APPOINTMENT

## 2022-08-23 NOTE — TELEPHONE ENCOUNTER
2ND ATTEMPT     Rx Refill Note  Requested Prescriptions     Pending Prescriptions Disp Refills   • atorvastatin (LIPITOR) 20 MG tablet [Pharmacy Med Name: Atorvastatin Calcium 20 MG Oral Tablet] 180 tablet 3     Sig: TAKE 2 TABLETS BY MOUTH  DAILY      Last office visit with prescribing clinician: 5/2/2022      Next office visit with prescribing clinician: Visit date not found            Liane Antonio MA  08/23/22, 08:35 EDT     Patient was to follow up in 6 months for HTN and thrombocytopenia called and left vm for patient to return call     OK FOR HUB TO RELAY MESSAGE AND SCHEDULE APPOINTMENT

## 2022-08-24 RX ORDER — ATORVASTATIN CALCIUM 20 MG/1
40 TABLET, FILM COATED ORAL DAILY
Qty: 180 TABLET | Refills: 3 | OUTPATIENT
Start: 2022-08-24

## 2022-08-24 NOTE — TELEPHONE ENCOUNTER
Patient was to follow up in 6 months for HTN and thrombocytopenia called and left vm for patient to return call     OK FOR HUB TO RELAY MESSAGE AND SCHEDULE APPOINTMENT     3rd attempt

## 2022-10-17 ENCOUNTER — TELEPHONE (OUTPATIENT)
Dept: FAMILY MEDICINE CLINIC | Facility: CLINIC | Age: 52
End: 2022-10-17

## 2022-10-17 RX ORDER — FLUOXETINE 10 MG/1
10 CAPSULE ORAL DAILY
Qty: 20 CAPSULE | Refills: 0 | Status: SHIPPED | OUTPATIENT
Start: 2022-10-17 | End: 2022-11-02 | Stop reason: SDUPTHER

## 2022-10-17 RX ORDER — ATORVASTATIN CALCIUM 20 MG/1
40 TABLET, FILM COATED ORAL DAILY
Qty: 180 TABLET | Refills: 0 | Status: SHIPPED | OUTPATIENT
Start: 2022-10-17 | End: 2022-11-02 | Stop reason: SDUPTHER

## 2022-10-17 RX ORDER — ATORVASTATIN CALCIUM 20 MG/1
40 TABLET, FILM COATED ORAL DAILY
Qty: 30 TABLET | Refills: 0 | Status: SHIPPED | OUTPATIENT
Start: 2022-10-17

## 2022-10-17 RX ORDER — AMLODIPINE BESYLATE 10 MG/1
10 TABLET ORAL DAILY
Qty: 20 TABLET | Refills: 0 | Status: SHIPPED | OUTPATIENT
Start: 2022-10-17 | End: 2022-11-02 | Stop reason: SDUPTHER

## 2022-10-17 NOTE — TELEPHONE ENCOUNTER
atorvastatin (LIPITOR) 20 MG tablet     The original prescription was reordered on 10/17/2022 by Elen Vanegas MA. Renewing this prescription may not be appropriate.     Rx Refill Note  Requested Prescriptions     Pending Prescriptions Disp Refills   • atorvastatin (LIPITOR) 20 MG tablet [Pharmacy Med Name: Atorvastatin Calcium 20 MG Oral Tablet] 180 tablet 0     Sig: TAKE 2 TABLETS BY MOUTH  DAILY      Last office visit with prescribing clinician: 5/2/2022      Next office visit with prescribing clinician: 10/17/2022            Debora Mccain MA  10/17/22, 15:06 EDT

## 2022-10-17 NOTE — TELEPHONE ENCOUNTER
Caller: Laureano De La Vega    Relationship to patient: Self    Best call back number: [phone]    Patient is needing: atorvastatin (LIPITOR) 20 MG tablet    FLUoxetine (PROzac) 10 MG capsule    amLODIPine (NORVASC) 10 MG tablet      PATIENT STATES HE HAS ENOUGH OF THESE MEDICATIONS TO GET HIM UNTIL SOMETIME NEXT WEEK. PATIENT DID SCHEDULE AN APPOINTMENT BUT COULD NOT GET IN UNTIL 11/02. PATIENT WOULD LIKE A TEMPORARY REFILL UNTIL THEN.

## 2022-10-17 NOTE — TELEPHONE ENCOUNTER
Rx Refill Note  Requested Prescriptions     Pending Prescriptions Disp Refills   • atorvastatin (LIPITOR) 20 MG tablet [Pharmacy Med Name: Atorvastatin Calcium 20 MG Oral Tablet] 180 tablet 3     Sig: TAKE 2 TABLETS BY MOUTH  DAILY      Last office visit with prescribing clinician: 5/2/2022      Next office visit with prescribing clinician: Visit date not found            Liane Antonio MA  10/17/22, 09:54 EDT     Lvm to call office back.    Hub can rely and schedule        Return in about 6 months (around 8/17/2022) for htn, thrombocytopenia.

## 2022-10-17 NOTE — TELEPHONE ENCOUNTER
Rx Refill Note  Requested Prescriptions     Signed Prescriptions Disp Refills   • atorvastatin (LIPITOR) 20 MG tablet 30 tablet 0     Sig: Take 2 tablets by mouth Daily.     Authorizing Provider: GENEVIEVE MARTINEZ     Ordering User: ELEN VANEGAS   • FLUoxetine (PROzac) 10 MG capsule 20 capsule 0     Sig: Take 1 capsule by mouth Daily.     Authorizing Provider: GENEVIEVE MARTINEZ     Ordering User: ELEN VANEGAS   • amLODIPine (NORVASC) 10 MG tablet 20 tablet 0     Sig: Take 1 tablet by mouth Daily.     Authorizing Provider: GENEVIEVE MARTINEZ     Ordering User: ELEN VANEGAS      Last office visit with prescribing clinician: 5/2/2022      Next office visit with prescribing clinician: 11/2/2022            Elen Vanegas MA  10/17/22, 14:32 EDT

## 2022-11-02 ENCOUNTER — LAB (OUTPATIENT)
Dept: LAB | Facility: HOSPITAL | Age: 52
End: 2022-11-02

## 2022-11-02 ENCOUNTER — OFFICE VISIT (OUTPATIENT)
Dept: FAMILY MEDICINE CLINIC | Facility: CLINIC | Age: 52
End: 2022-11-02

## 2022-11-02 VITALS
SYSTOLIC BLOOD PRESSURE: 138 MMHG | WEIGHT: 246.6 LBS | BODY MASS INDEX: 29.12 KG/M2 | DIASTOLIC BLOOD PRESSURE: 80 MMHG | HEIGHT: 77 IN | OXYGEN SATURATION: 99 % | HEART RATE: 82 BPM

## 2022-11-02 DIAGNOSIS — E78.5 DYSLIPIDEMIA: ICD-10-CM

## 2022-11-02 DIAGNOSIS — R05.3 CHRONIC COUGH: Primary | ICD-10-CM

## 2022-11-02 DIAGNOSIS — Z12.5 PROSTATE CANCER SCREENING: ICD-10-CM

## 2022-11-02 DIAGNOSIS — I10 PRIMARY HYPERTENSION: ICD-10-CM

## 2022-11-02 DIAGNOSIS — E11.9 TYPE 2 DIABETES MELLITUS WITHOUT COMPLICATION, WITHOUT LONG-TERM CURRENT USE OF INSULIN: ICD-10-CM

## 2022-11-02 PROBLEM — R73.03 PRE-DIABETES: Status: RESOLVED | Noted: 2022-02-17 | Resolved: 2022-11-02

## 2022-11-02 PROCEDURE — 90686 IIV4 VACC NO PRSV 0.5 ML IM: CPT | Performed by: INTERNAL MEDICINE

## 2022-11-02 PROCEDURE — 82043 UR ALBUMIN QUANTITATIVE: CPT

## 2022-11-02 PROCEDURE — 83036 HEMOGLOBIN GLYCOSYLATED A1C: CPT

## 2022-11-02 PROCEDURE — G0103 PSA SCREENING: HCPCS

## 2022-11-02 PROCEDURE — 36415 COLL VENOUS BLD VENIPUNCTURE: CPT

## 2022-11-02 PROCEDURE — 90471 IMMUNIZATION ADMIN: CPT | Performed by: INTERNAL MEDICINE

## 2022-11-02 PROCEDURE — 80061 LIPID PANEL: CPT

## 2022-11-02 PROCEDURE — 99214 OFFICE O/P EST MOD 30 MIN: CPT | Performed by: INTERNAL MEDICINE

## 2022-11-02 RX ORDER — AMLODIPINE BESYLATE 10 MG/1
10 TABLET ORAL DAILY
Qty: 20 TABLET | Refills: 0 | Status: SHIPPED | OUTPATIENT
Start: 2022-11-02

## 2022-11-02 RX ORDER — FLUOXETINE 10 MG/1
10 CAPSULE ORAL DAILY
Qty: 20 CAPSULE | Refills: 0 | Status: SHIPPED | OUTPATIENT
Start: 2022-11-02 | End: 2022-12-29

## 2022-11-02 RX ORDER — ATORVASTATIN CALCIUM 20 MG/1
40 TABLET, FILM COATED ORAL DAILY
Qty: 180 TABLET | Refills: 0 | Status: SHIPPED | OUTPATIENT
Start: 2022-11-02 | End: 2023-02-23

## 2022-11-03 LAB
ALBUMIN UR-MCNC: <1.2 MG/DL
CHOLEST SERPL-MCNC: 191 MG/DL (ref 0–200)
HBA1C MFR BLD: 6.8 % (ref 4.8–5.6)
HDLC SERPL-MCNC: 33 MG/DL (ref 40–60)
LDLC SERPL CALC-MCNC: 123 MG/DL (ref 0–100)
LDLC/HDLC SERPL: 3.58 {RATIO}
PSA SERPL-MCNC: 2.5 NG/ML (ref 0–4)
TRIGL SERPL-MCNC: 199 MG/DL (ref 0–150)
VLDLC SERPL-MCNC: 35 MG/DL (ref 5–40)

## 2022-11-03 RX ORDER — TIRZEPATIDE 2.5 MG/.5ML
2.5 INJECTION, SOLUTION SUBCUTANEOUS WEEKLY
Qty: 2 ML | Refills: 2 | Status: SHIPPED | OUTPATIENT
Start: 2022-11-03 | End: 2022-11-29

## 2022-11-03 NOTE — PROGRESS NOTES
His cholesterol is worse, is he taking his Lipitor?  Also his diabetes is worse his A1c is now 6.8.  I have sent in a prescription for Mounjaro 2.5 mg injected weekly.  This medication should help with weight loss as well as prevent his diabetes from getting worse and actually improve it.  After 1 month we will begin titrating the dose up.  Been shown to be quite effective for weight loss as well as diabetes

## 2022-11-28 ENCOUNTER — PATIENT MESSAGE (OUTPATIENT)
Dept: FAMILY MEDICINE CLINIC | Facility: CLINIC | Age: 52
End: 2022-11-28

## 2022-11-29 RX ORDER — TIRZEPATIDE 7.5 MG/.5ML
7.5 INJECTION, SOLUTION SUBCUTANEOUS WEEKLY
Qty: 2 ML | Refills: 3 | Status: SHIPPED | OUTPATIENT
Start: 2022-11-29 | End: 2022-11-30

## 2022-11-30 RX ORDER — TIRZEPATIDE 5 MG/.5ML
5 INJECTION, SOLUTION SUBCUTANEOUS WEEKLY
Qty: 2 ML | Refills: 3 | Status: SHIPPED | OUTPATIENT
Start: 2022-11-30 | End: 2023-01-05 | Stop reason: SDUPTHER

## 2022-12-29 RX ORDER — FLUOXETINE 10 MG/1
10 CAPSULE ORAL DAILY
Qty: 20 CAPSULE | Refills: 0 | Status: SHIPPED | OUTPATIENT
Start: 2022-12-29 | End: 2023-01-18

## 2022-12-29 NOTE — TELEPHONE ENCOUNTER
Rx Refill Note  Requested Prescriptions     Pending Prescriptions Disp Refills   • FLUoxetine (PROzac) 10 MG capsule [Pharmacy Med Name: FLUoxetine HCl 10 MG Oral Capsule] 20 capsule 0     Sig: TAKE 1 CAPSULE BY MOUTH DAILY      Last office visit with prescribing clinician: 11/2/2022   Next office visit with prescribing clinician: 2/21/2023       Francesca Hoffman MA  12/29/22, 12:52 EST

## 2023-01-04 ENCOUNTER — PRIOR AUTHORIZATION (OUTPATIENT)
Dept: FAMILY MEDICINE CLINIC | Facility: CLINIC | Age: 53
End: 2023-01-04
Payer: COMMERCIAL

## 2023-01-04 ENCOUNTER — PATIENT MESSAGE (OUTPATIENT)
Dept: FAMILY MEDICINE CLINIC | Facility: CLINIC | Age: 53
End: 2023-01-04
Payer: COMMERCIAL

## 2023-01-04 RX ORDER — TIRZEPATIDE 5 MG/.5ML
5 INJECTION, SOLUTION SUBCUTANEOUS WEEKLY
Qty: 2 ML | Refills: 3 | Status: CANCELLED | OUTPATIENT
Start: 2023-01-04

## 2023-01-04 NOTE — TELEPHONE ENCOUNTER
(Key: [id number]) - [id number]  Mounjaro 5MG/0.5ML pen-injectors  Status:sent to plan  Created: January 4th, 2023 [phone]  Sent: January 4th, 2023    Approved today  Request Reference Number: [id number]. MOUNJARO INJ 5MG/0.5 is approved through 01/04/2024. Your patient may now fill this prescription and it will be covered.    Informed via [facility]

## 2023-01-04 NOTE — TELEPHONE ENCOUNTER
Rx Refill Note  Requested Prescriptions     Pending Prescriptions Disp Refills   • Tirzepatide (Mounjaro) 5 MG/0.5ML solution pen-injector 2 mL 3     Sig: Inject 0.5 mL under the skin into the appropriate area as directed 1 (One) Time Per Week.      Last office visit with prescribing clinician: 11/2/2022   Last telemedicine visit with prescribing clinician: 1/4/2023   Next office visit with prescribing clinician: 1/4/2023                         Would you like a call back once the refill request has been completed: [] Yes [] No    If the office needs to give you a call back, can they leave a voicemail: [] Yes [] No    Josephine Morley MA  01/04/23, 16:03 EST

## 2023-01-05 RX ORDER — TIRZEPATIDE 5 MG/.5ML
5 INJECTION, SOLUTION SUBCUTANEOUS WEEKLY
Qty: 2 ML | Refills: 3 | Status: SHIPPED | OUTPATIENT
Start: 2023-01-05 | End: 2023-03-06 | Stop reason: SDUPTHER

## 2023-01-06 NOTE — TELEPHONE ENCOUNTER
Rx Refill Note  Requested Prescriptions      No prescriptions requested or ordered in this encounter      Last office visit with prescribing clinician: 11/2/2022   Last telemedicine visit with prescribing clinician: 1/4/2023   Next office visit with prescribing clinician: 1/4/2023                         Would you like a call back once the refill request has been completed: [] Yes [] No    If the office needs to give you a call back, can they leave a voicemail: [] Yes [] No    Josephine Morley MA  01/06/23, 10:13 EST

## 2023-01-18 RX ORDER — FLUOXETINE 10 MG/1
10 CAPSULE ORAL DAILY
Qty: 20 CAPSULE | Refills: 0 | Status: SHIPPED | OUTPATIENT
Start: 2023-01-18

## 2023-01-19 RX ORDER — FLUOXETINE 10 MG/1
10 CAPSULE ORAL DAILY
Qty: 20 CAPSULE | Refills: 0 | OUTPATIENT
Start: 2023-01-19

## 2023-02-21 NOTE — TELEPHONE ENCOUNTER
Rx Refill Note  Requested Prescriptions     Pending Prescriptions Disp Refills   • atorvastatin (LIPITOR) 20 MG tablet [Pharmacy Med Name: Atorvastatin Calcium 20 MG Oral Tablet] 180 tablet 3     Sig: TAKE 2 TABLETS BY MOUTH DAILY      Last office visit with prescribing clinician: 11/2/2022   Last telemedicine visit with prescribing clinician: Visit date not found   Next office visit with prescribing clinician: Visit date not found                         Would you like a call back once the refill request has been completed: [] Yes [] No    If the office needs to give you a call back, can they leave a voicemail: [] Yes [] No    Liane Antonio MA  02/21/23, 08:33 EST     Lvm to call office back.    Return in about 3 months (around 2/2/2023) for Annual.\    \HUB CAN RELY MESSAGE AND SCHEDULE

## 2023-02-22 NOTE — TELEPHONE ENCOUNTER
2nd attempt    Rx Refill Note  Requested Prescriptions     Pending Prescriptions Disp Refills   • atorvastatin (LIPITOR) 20 MG tablet [Pharmacy Med Name: Atorvastatin Calcium 20 MG Oral Tablet] 180 tablet 3     Sig: TAKE 2 TABLETS BY MOUTH DAILY      Last office visit with prescribing clinician: 11/2/2022   Last telemedicine visit with prescribing clinician: Visit date not found   Next office visit with prescribing clinician: Visit date not found                         Would you like a call back once the refill request has been completed: [] Yes [] No    If the office needs to give you a call back, can they leave a voicemail: [] Yes [] No    Liane Antonio MA  02/22/23, 09:35 EST     Lvm to call office back.     Return in about 3 months (around 2/2/2023) for Annual.\     \HUB CAN RELY MESSAGE AND SCHEDULE

## 2023-02-23 RX ORDER — ATORVASTATIN CALCIUM 20 MG/1
40 TABLET, FILM COATED ORAL DAILY
Qty: 180 TABLET | Refills: 3 | Status: SHIPPED | OUTPATIENT
Start: 2023-02-23 | End: 2023-03-06

## 2023-02-23 NOTE — TELEPHONE ENCOUNTER
Rx Refill Note  Requested Prescriptions     Pending Prescriptions Disp Refills   • atorvastatin (LIPITOR) 20 MG tablet [Pharmacy Med Name: Atorvastatin Calcium 20 MG Oral Tablet] 180 tablet 3     Sig: TAKE 2 TABLETS BY MOUTH DAILY      Last office visit with prescribing clinician: 11/2/2022   Last telemedicine visit with prescribing clinician: 3/6/2023   Next office visit with prescribing clinician: 3/6/2023                         Would you like a call back once the refill request has been completed: [] Yes [] No    If the office needs to give you a call back, can they leave a voicemail: [] Yes [] No    Liane Antonio MA  02/23/23, 09:27 EST

## 2023-03-06 ENCOUNTER — OFFICE VISIT (OUTPATIENT)
Dept: FAMILY MEDICINE CLINIC | Facility: CLINIC | Age: 53
End: 2023-03-06
Payer: COMMERCIAL

## 2023-03-06 ENCOUNTER — LAB (OUTPATIENT)
Dept: LAB | Facility: HOSPITAL | Age: 53
End: 2023-03-06
Payer: COMMERCIAL

## 2023-03-06 VITALS
WEIGHT: 219 LBS | HEART RATE: 82 BPM | DIASTOLIC BLOOD PRESSURE: 78 MMHG | BODY MASS INDEX: 25.86 KG/M2 | OXYGEN SATURATION: 97 % | HEIGHT: 77 IN | SYSTOLIC BLOOD PRESSURE: 126 MMHG

## 2023-03-06 DIAGNOSIS — E11.9 TYPE 2 DIABETES MELLITUS WITHOUT COMPLICATION, WITHOUT LONG-TERM CURRENT USE OF INSULIN: ICD-10-CM

## 2023-03-06 DIAGNOSIS — Z12.11 COLON CANCER SCREENING: Primary | ICD-10-CM

## 2023-03-06 DIAGNOSIS — E78.5 DYSLIPIDEMIA: ICD-10-CM

## 2023-03-06 DIAGNOSIS — M10.9 GOUT, UNSPECIFIED CAUSE, UNSPECIFIED CHRONICITY, UNSPECIFIED SITE: ICD-10-CM

## 2023-03-06 DIAGNOSIS — I10 PRIMARY HYPERTENSION: ICD-10-CM

## 2023-03-06 DIAGNOSIS — F32.4 MAJOR DEPRESSIVE DISORDER WITH SINGLE EPISODE, IN PARTIAL REMISSION: ICD-10-CM

## 2023-03-06 DIAGNOSIS — D22.9 MULTIPLE PIGMENTED NEVI: ICD-10-CM

## 2023-03-06 PROBLEM — Z00.00 ANNUAL PHYSICAL EXAM: Status: ACTIVE | Noted: 2023-03-06

## 2023-03-06 LAB
ALBUMIN SERPL-MCNC: 4.7 G/DL (ref 3.5–5.2)
ALBUMIN/GLOB SERPL: 2 G/DL
ALP SERPL-CCNC: 84 U/L (ref 39–117)
ALT SERPL W P-5'-P-CCNC: 47 U/L (ref 1–41)
ANION GAP SERPL CALCULATED.3IONS-SCNC: 8.8 MMOL/L (ref 5–15)
AST SERPL-CCNC: 28 U/L (ref 1–40)
BASOPHILS # BLD AUTO: 0.03 10*3/MM3 (ref 0–0.2)
BASOPHILS NFR BLD AUTO: 0.3 % (ref 0–1.5)
BILIRUB SERPL-MCNC: 0.6 MG/DL (ref 0–1.2)
BUN SERPL-MCNC: 11 MG/DL (ref 6–20)
BUN/CREAT SERPL: 11.3 (ref 7–25)
CALCIUM SPEC-SCNC: 9.7 MG/DL (ref 8.6–10.5)
CHLORIDE SERPL-SCNC: 102 MMOL/L (ref 98–107)
CHOLEST SERPL-MCNC: 146 MG/DL (ref 0–200)
CO2 SERPL-SCNC: 30.2 MMOL/L (ref 22–29)
CREAT SERPL-MCNC: 0.97 MG/DL (ref 0.76–1.27)
DEPRECATED RDW RBC AUTO: 40.4 FL (ref 37–54)
EGFRCR SERPLBLD CKD-EPI 2021: 94.5 ML/MIN/1.73
EOSINOPHIL # BLD AUTO: 0.15 10*3/MM3 (ref 0–0.4)
EOSINOPHIL NFR BLD AUTO: 1.7 % (ref 0.3–6.2)
ERYTHROCYTE [DISTWIDTH] IN BLOOD BY AUTOMATED COUNT: 13.3 % (ref 12.3–15.4)
EXPIRATION DATE: NORMAL
GLOBULIN UR ELPH-MCNC: 2.4 GM/DL
GLUCOSE SERPL-MCNC: 104 MG/DL (ref 65–99)
HBA1C MFR BLD: 5.5 %
HCT VFR BLD AUTO: 46.2 % (ref 37.5–51)
HDLC SERPL-MCNC: 34 MG/DL (ref 40–60)
HGB BLD-MCNC: 15.8 G/DL (ref 13–17.7)
LDLC SERPL CALC-MCNC: 92 MG/DL (ref 0–100)
LDLC/HDLC SERPL: 2.66 {RATIO}
LYMPHOCYTES # BLD AUTO: 2.5 10*3/MM3 (ref 0.7–3.1)
LYMPHOCYTES NFR BLD AUTO: 29 % (ref 19.6–45.3)
Lab: NORMAL
MCH RBC QN AUTO: 29 PG (ref 26.6–33)
MCHC RBC AUTO-ENTMCNC: 34.2 G/DL (ref 31.5–35.7)
MCV RBC AUTO: 84.8 FL (ref 79–97)
MONOCYTES # BLD AUTO: 0.76 10*3/MM3 (ref 0.1–0.9)
MONOCYTES NFR BLD AUTO: 8.8 % (ref 5–12)
NEUTROPHILS NFR BLD AUTO: 5.14 10*3/MM3 (ref 1.7–7)
NEUTROPHILS NFR BLD AUTO: 59.9 % (ref 42.7–76)
PLATELET # BLD AUTO: 105 10*3/MM3 (ref 140–450)
POTASSIUM SERPL-SCNC: 4.2 MMOL/L (ref 3.5–5.2)
PROT SERPL-MCNC: 7.1 G/DL (ref 6–8.5)
RBC # BLD AUTO: 5.45 10*6/MM3 (ref 4.14–5.8)
SODIUM SERPL-SCNC: 141 MMOL/L (ref 136–145)
TRIGL SERPL-MCNC: 107 MG/DL (ref 0–150)
TSH SERPL DL<=0.05 MIU/L-ACNC: 1.79 UIU/ML (ref 0.27–4.2)
VLDLC SERPL-MCNC: 20 MG/DL (ref 5–40)
WBC NRBC COR # BLD: 8.61 10*3/MM3 (ref 3.4–10.8)

## 2023-03-06 PROCEDURE — 80053 COMPREHEN METABOLIC PANEL: CPT | Performed by: INTERNAL MEDICINE

## 2023-03-06 PROCEDURE — 80061 LIPID PANEL: CPT | Performed by: INTERNAL MEDICINE

## 2023-03-06 PROCEDURE — 99213 OFFICE O/P EST LOW 20 MIN: CPT | Performed by: INTERNAL MEDICINE

## 2023-03-06 PROCEDURE — 99396 PREV VISIT EST AGE 40-64: CPT | Performed by: INTERNAL MEDICINE

## 2023-03-06 PROCEDURE — 84443 ASSAY THYROID STIM HORMONE: CPT | Performed by: INTERNAL MEDICINE

## 2023-03-06 PROCEDURE — 90471 IMMUNIZATION ADMIN: CPT | Performed by: INTERNAL MEDICINE

## 2023-03-06 PROCEDURE — 90715 TDAP VACCINE 7 YRS/> IM: CPT | Performed by: INTERNAL MEDICINE

## 2023-03-06 PROCEDURE — 83036 HEMOGLOBIN GLYCOSYLATED A1C: CPT | Performed by: INTERNAL MEDICINE

## 2023-03-06 PROCEDURE — 90472 IMMUNIZATION ADMIN EACH ADD: CPT | Performed by: INTERNAL MEDICINE

## 2023-03-06 PROCEDURE — 90677 PCV20 VACCINE IM: CPT | Performed by: INTERNAL MEDICINE

## 2023-03-06 PROCEDURE — 85025 COMPLETE CBC W/AUTO DIFF WBC: CPT | Performed by: INTERNAL MEDICINE

## 2023-03-06 RX ORDER — HYDROCODONE BITARTRATE AND ACETAMINOPHEN 5; 325 MG/1; MG/1
1 TABLET ORAL EVERY 6 HOURS PRN
Qty: 12 TABLET | Refills: 0 | Status: SHIPPED | OUTPATIENT
Start: 2023-03-06

## 2023-03-06 RX ORDER — TIRZEPATIDE 5 MG/.5ML
5 INJECTION, SOLUTION SUBCUTANEOUS WEEKLY
Qty: 2 ML | Refills: 3 | Status: SHIPPED | OUTPATIENT
Start: 2023-03-06

## 2023-03-06 NOTE — ASSESSMENT & PLAN NOTE
Currently tolerating atorvastatin.  Fasting lipid profile requested with significant weight loss.  Continued efforts behavior modification   History of coronary artery disease, hypertension, hyperlipidemia. Follows with cardiology. On losartan 25 mg and metoprolol succinate 12.5 mg daily, aspirin 81 mg daily. Also on atorvastatin 40 mg nightly. She is not having any significant chest pain and blood pressures are under good control.

## 2023-03-06 NOTE — PROGRESS NOTES
Laureano De La Vega  1970  [id number]  Patient Care Team:  Nikko Alcantar MD as PCP - General (Internal Medicine)    Laureano De La Vega is a 52 y.o. male who is here today for his annual physical   This patient is accompanied by his self who contributes to the history of his care.    Chief Complaint:    Chief Complaint   Patient presents with   • Annual Exam   • Suspicious Skin Lesion     On back         History of Present Illness:    Laureano is here for annual exam. He is tolerating Mounjaro and tolerating this with out side effects. He is having trouble finding it. However, he is experiencing good weight loss and better glycemic control. He is working on increased physical activity.  He remains current with optho. He is due for colonscopy and tdap/pneumo. Is holding at fluoxetine 10- increased stress at work.- withdraws however would not like to increase dose.. His wife has noted a changing mole on his back and would like this looked at.  Try to eat healthfully at home, safety measures were discussed.    He has used less than 12 Norco's for gout in the past year and a half.  He is down to 1 pill.  Gout remains pretty much injected.  Blood pressures under good control at home.  Past Medical History:   Diagnosis Date   • Arthritis 1990    gout   • Depression    • Diabetes mellitus (HCC)     previously diagnosed with pre-diabetes   • GERD (gastroesophageal reflux disease)    • Gout    • Hyperlipidemia    • Hypertension        No past surgical history on file.     Family History   Problem Relation Age of Onset   • Alcohol abuse Mother    • Arthritis Father    • Hypertension Father    • Diabetes Father    • Hyperlipidemia Father    • COPD Father    • Sleep apnea Father    • Heart disease Paternal Grandfather         chf   • Cancer Neg Hx    • Stroke Neg Hx        Social History     Socioeconomic History   • Marital status:    Tobacco Use   • Smoking status: Never   • Smokeless tobacco: Never   Vaping Use   • Vaping Use:  "Never used   Substance and Sexual Activity   • Alcohol use: Not Currently     Alcohol/week: 0.0 standard drinks   • Drug use: Never   • Sexual activity: Not Currently     Partners: Female     Birth control/protection: Coitus interruptus, Condom       No Known Allergies    Depression: PHQ-2 Depression Screening  Little interest or pleasure in doing things? 0-->not at all   Feeling down, depressed, or hopeless? 0-->not at all   PHQ-2 Total Score 0      Immunization History   Administered Date(s) Administered   • FluLaval/Fluzone >6mos 11/02/2022   • Influenza Injectable Mdck Pf Quad 11/02/2022   • Pneumococcal Conjugate 20-Valent (PCV20) 03/06/2023   • Tdap 03/06/2023       Review of Systems:    Review of Systems   Constitutional: Positive for unexpected weight loss. Negative for chills, fatigue, fever and unexpected weight gain.   HENT: Negative for ear pain, postnasal drip, sinus pressure and sore throat.    Eyes: Negative for blurred vision, double vision and visual disturbance.   Respiratory: Negative for cough, shortness of breath and wheezing.    Cardiovascular: Negative for chest pain, palpitations and leg swelling.   Gastrointestinal: Negative for abdominal pain, blood in stool, diarrhea, nausea and vomiting.   Endocrine: Negative for cold intolerance, heat intolerance, polydipsia, polyphagia and polyuria.   Genitourinary: Negative for dysuria, flank pain and hematuria.   Musculoskeletal: Negative for arthralgias and joint swelling.   Skin: Positive for skin lesions. Negative for dry skin and rash.   Neurological: Negative for weakness, numbness and headache.   Psychiatric/Behavioral: Negative for self-injury, suicidal ideas and depressed mood.       Vitals:    03/06/23 1255   BP: 126/78   Pulse: 82   SpO2: 97%   Weight: 99.3 kg (219 lb)   Height: 195.6 cm (77.01\")     Body mass index is 25.96 kg/m².      Current Outpatient Medications:   •  amLODIPine (NORVASC) 10 MG tablet, Take 1 tablet by mouth Daily., " Disp: 20 tablet, Rfl: 0  •  atorvastatin (LIPITOR) 20 MG tablet, Take 2 tablets by mouth Daily., Disp: 30 tablet, Rfl: 0  •  FLUoxetine (PROzac) 10 MG capsule, TAKE 1 CAPSULE BY MOUTH DAILY, Disp: 20 capsule, Rfl: 0  •  HYDROcodone-acetaminophen (Norco) 5-325 MG per tablet, Take 1 tablet by mouth Every 6 (Six) Hours As Needed (gout falir)., Disp: 12 tablet, Rfl: 0  •  Tirzepatide (Mounjaro) 5 MG/0.5ML solution pen-injector, Inject 0.5 mL under the skin into the appropriate area as directed 1 (One) Time Per Week., Disp: 2 mL, Rfl: 3    Physical Exam:    Physical Exam  Vitals and nursing note reviewed.   Constitutional:       General: He is not in acute distress.     Appearance: He is well-developed. He is not diaphoretic.   HENT:      Head: Normocephalic and atraumatic.      Right Ear: External ear normal.      Left Ear: External ear normal.      Mouth/Throat:      Pharynx: No oropharyngeal exudate.   Eyes:      General: No scleral icterus.        Right eye: No discharge.      Conjunctiva/sclera: Conjunctivae normal.   Neck:      Thyroid: No thyromegaly.      Vascular: No JVD.      Trachea: No tracheal deviation.   Cardiovascular:      Rate and Rhythm: Normal rate and regular rhythm.      Pulses:           Dorsalis pedis pulses are 2+ on the right side and 2+ on the left side.        Posterior tibial pulses are 2+ on the right side.      Heart sounds: Normal heart sounds.      Comments: PMI nondisplaced  Pulmonary:      Effort: Pulmonary effort is normal.      Breath sounds: Normal breath sounds. No wheezing or rales.   Abdominal:      General: Bowel sounds are normal.      Palpations: Abdomen is soft.      Tenderness: There is no abdominal tenderness. There is no guarding or rebound.   Musculoskeletal:      Cervical back: Normal range of motion and neck supple.      Comments: Normal gait   Feet:      Right foot:      Protective Sensation: 5 sites tested. 5 sites sensed.      Skin integrity: Skin integrity normal.       Left foot:      Protective Sensation: 5 sites tested. 5 sites sensed.      Skin integrity: Skin integrity normal.   Lymphadenopathy:      Cervical: No cervical adenopathy.   Skin:     General: Skin is warm and dry.      Capillary Refill: Capillary refill takes less than 2 seconds.      Coloration: Skin is not pale.      Findings: No rash.   Neurological:      Mental Status: He is alert and oriented to person, place, and time.      Motor: No abnormal muscle tone.      Coordination: Coordination normal.   Psychiatric:         Judgment: Judgment normal.         Procedures    Results Review:    I reviewed the patient's new clinical results.  A1c is improved from 6.8-5.5    Assessment/Plan:     Problem List Items Addressed This Visit        Cardiac and Vasculature    Primary hypertension    Current Assessment & Plan     Hypertension is improving with treatment.  Continue current treatment regimen.  Dietary sodium restriction.  Weight loss.  Regular aerobic exercise.  Stop smoking.  Continue current medications.  Blood pressure will be reassessed at the next regular appointment.         Relevant Medications    amLODIPine (NORVASC) 10 MG tablet    Other Relevant Orders    CBC & Differential    Comprehensive Metabolic Panel    Lipid Panel    TSH Rfx On Abnormal To Free T4    Dyslipidemia    Current Assessment & Plan     Currently tolerating atorvastatin.  Fasting lipid profile requested with significant weight loss.  Continued efforts behavior modification         Relevant Orders    Lipid Panel       Endocrine and Metabolic    Type 2 diabetes mellitus without complication, without long-term current use of insulin (HCC)    Current Assessment & Plan     Diabetes is improving with treatment.   Reminded to bring in blood sugar diary at next visit.  Reminded to get yearly retinal exam.  Medication changes per orders.  Diabetes will be reassessed in 6 months.  We talked about increasing her for a behavior modification with diet  and exercise.  In 3 months, if his weight is maintaining, we will start to taper him down of Mounjaro to 2.5 mg weekly.  I did discuss the importance of behavior modification while on this medication.         Relevant Medications    Tirzepatide (Mounjaro) 5 MG/0.5ML solution pen-injector    Other Relevant Orders    POC Glycosylated Hemoglobin (Hb A1C) (Completed)       Mental Health    Major depressive disorder with single episode, in partial remission ([facility])    Current Assessment & Plan     Patient's depression is single episode and is mild without psychosis. Their depression is currently in partial remission and the condition is improving with treatment. This will be reassessed at the next regular appointment. F/U as described:patient was prescribed an antidepressant medicine.         Relevant Medications    FLUoxetine (PROzac) 10 MG capsule       Musculoskeletal and Injuries    Gout    Current Assessment & Plan     Refilled Norco No. 12 use with flares.  Continue diet and avoid         Relevant Medications    HYDROcodone-acetaminophen (Norco) 5-325 MG per tablet   Other Visit Diagnoses     Colon cancer screening    -  Primary    Relevant Orders    Ambulatory Referral For Screening Colonoscopy    Multiple pigmented nevi        Relevant Orders    Ambulatory Referral to Dermatology          Plan of care was reviewed with patient at the conclusion of today's visit. Counseled patient with regards to good nutrition and diet. Maintaining a healthy lifestyle including exercise and physical activities. Spoke with patient on ways to reduce stress, getting adequate sleep and injury prevention.  Discussed prostate cancer screening, colon cancer screening including benefit of early detection and potential need for follow-up. Patient agrees to screenings today. Annual dental and eye exams were encouraged. Encouraged patient to continue to follow up with annual immunizations.     Return in about 6 months (around 9/6/2023) for  dm/htn.    Nikko Alcantar MD      Please note than portions of this note were completed wt a Voice Recognition Program

## 2023-03-06 NOTE — ASSESSMENT & PLAN NOTE
Patient's depression is single episode and is mild without psychosis. Their depression is currently in partial remission and the condition is improving with treatment. This will be reassessed at the next regular appointment. F/U as described:patient was prescribed an antidepressant medicine.

## 2023-03-06 NOTE — ASSESSMENT & PLAN NOTE
Diabetes is improving with treatment.   Reminded to bring in blood sugar diary at next visit.  Reminded to get yearly retinal exam.  Medication changes per orders.  Diabetes will be reassessed in 6 months.  We talked about increasing her for a behavior modification with diet and exercise.  In 3 months, if his weight is maintaining, we will start to taper him down of Mounjaro to 2.5 mg weekly.  I did discuss the importance of behavior modification while on this medication.

## 2023-03-07 DIAGNOSIS — D69.6 THROMBOCYTOPENIA: Primary | ICD-10-CM

## 2023-03-07 NOTE — PROGRESS NOTES
Lab work is okay however his platelet counts were low and are continuing to drop.  I have ordered another test on his platelets that needs to be drawn.  If this is normal  no further evaluation needed-however if his platelets are low on this next assay, need to refer him to a hematologist to evaluate why his platelets are low.  Thank you.  (He needs to return to the lab for blood work.

## 2023-04-18 RX ORDER — FLUOXETINE 10 MG/1
10 CAPSULE ORAL DAILY
Qty: 20 CAPSULE | Refills: 0 | Status: SHIPPED | OUTPATIENT
Start: 2023-04-18

## 2023-04-18 NOTE — TELEPHONE ENCOUNTER
Rx Refill Note  Requested Prescriptions     Pending Prescriptions Disp Refills   • FLUoxetine (PROzac) 10 MG capsule [Pharmacy Med Name: FLUoxetine HCl 10 MG Oral Capsule] 20 capsule 0     Sig: TAKE 1 CAPSULE BY MOUTH DAILY      Last office visit with prescribing clinician: 3/6/2023   Last telemedicine visit with prescribing clinician: 9/7/2023   Next office visit with prescribing clinician: 9/7/2023                         Would you like a call back once the refill request has been completed: [] Yes [] No    If the office needs to give you a call back, can they leave a voicemail: [] Yes [] No    Debora Mccain MA  04/18/23, 09:32 EDT

## 2023-08-02 ENCOUNTER — PATIENT MESSAGE (OUTPATIENT)
Dept: FAMILY MEDICINE CLINIC | Facility: CLINIC | Age: 53
End: 2023-08-02
Payer: COMMERCIAL

## 2023-08-02 DIAGNOSIS — M10.9 GOUT, UNSPECIFIED CAUSE, UNSPECIFIED CHRONICITY, UNSPECIFIED SITE: ICD-10-CM

## 2023-08-02 RX ORDER — HYDROCODONE BITARTRATE AND ACETAMINOPHEN 5; 325 MG/1; MG/1
1 TABLET ORAL EVERY 6 HOURS PRN
Qty: 12 TABLET | Refills: 0 | Status: SHIPPED | OUTPATIENT
Start: 2023-08-02

## 2023-08-02 RX ORDER — PREDNISONE 10 MG/1
TABLET ORAL
Qty: 1 EACH | Refills: 0 | Status: SHIPPED | OUTPATIENT
Start: 2023-08-02

## 2023-11-06 DIAGNOSIS — M10.9 GOUT, UNSPECIFIED CAUSE, UNSPECIFIED CHRONICITY, UNSPECIFIED SITE: ICD-10-CM

## 2023-11-07 RX ORDER — PREDNISONE 10 MG/1
TABLET ORAL
Qty: 1 EACH | Refills: 0 | Status: SHIPPED | OUTPATIENT
Start: 2023-11-07

## 2023-11-08 RX ORDER — HYDROCODONE BITARTRATE AND ACETAMINOPHEN 5; 325 MG/1; MG/1
1 TABLET ORAL EVERY 6 HOURS PRN
Qty: 12 TABLET | Refills: 0 | Status: SHIPPED | OUTPATIENT
Start: 2023-11-08

## 2023-11-20 NOTE — TELEPHONE ENCOUNTER
Rx Refill Note  Requested Prescriptions     Pending Prescriptions Disp Refills    amLODIPine (NORVASC) 10 MG tablet [Pharmacy Med Name: amLODIPine Besylate 10 MG Oral Tablet] 90 tablet 3     Sig: TAKE 1 TABLET BY MOUTH DAILY    FLUoxetine (PROzac) 10 MG capsule [Pharmacy Med Name: FLUoxetine HCl 10 MG Oral Capsule] 90 capsule 3     Sig: TAKE 1 CAPSULE BY MOUTH DAILY      Last office visit with prescribing clinician: 3/6/2023     Next office visit with prescribing clinician: Visit date not found   Left vm for patient to give us a call back to schedule appointment.  Verito Clemente MA  11/20/23, 08:29 EST

## 2023-11-21 RX ORDER — AMLODIPINE BESYLATE 10 MG/1
10 TABLET ORAL DAILY
Qty: 90 TABLET | Refills: 0 | Status: SHIPPED | OUTPATIENT
Start: 2023-11-21

## 2023-11-21 RX ORDER — FLUOXETINE 10 MG/1
10 CAPSULE ORAL DAILY
Qty: 90 CAPSULE | Refills: 0 | Status: SHIPPED | OUTPATIENT
Start: 2023-11-21

## 2023-12-19 ENCOUNTER — LAB (OUTPATIENT)
Dept: LAB | Facility: HOSPITAL | Age: 53
End: 2023-12-19
Payer: COMMERCIAL

## 2023-12-19 ENCOUNTER — OFFICE VISIT (OUTPATIENT)
Dept: FAMILY MEDICINE CLINIC | Facility: CLINIC | Age: 53
End: 2023-12-19
Payer: COMMERCIAL

## 2023-12-19 VITALS
HEIGHT: 77 IN | SYSTOLIC BLOOD PRESSURE: 142 MMHG | DIASTOLIC BLOOD PRESSURE: 92 MMHG | HEART RATE: 94 BPM | OXYGEN SATURATION: 97 % | BODY MASS INDEX: 28.07 KG/M2 | WEIGHT: 237.7 LBS

## 2023-12-19 DIAGNOSIS — M10.9 GOUT, UNSPECIFIED CAUSE, UNSPECIFIED CHRONICITY, UNSPECIFIED SITE: ICD-10-CM

## 2023-12-19 DIAGNOSIS — E11.9 TYPE 2 DIABETES MELLITUS WITHOUT COMPLICATION, WITHOUT LONG-TERM CURRENT USE OF INSULIN: Primary | ICD-10-CM

## 2023-12-19 DIAGNOSIS — I10 PRIMARY HYPERTENSION: ICD-10-CM

## 2023-12-19 DIAGNOSIS — E78.5 HYPERLIPIDEMIA, UNSPECIFIED HYPERLIPIDEMIA TYPE: ICD-10-CM

## 2023-12-19 DIAGNOSIS — E78.5 DYSLIPIDEMIA: ICD-10-CM

## 2023-12-19 DIAGNOSIS — Z12.11 SCREENING FOR COLON CANCER: ICD-10-CM

## 2023-12-19 DIAGNOSIS — Z12.5 PROSTATE CANCER SCREENING: ICD-10-CM

## 2023-12-19 DIAGNOSIS — Z51.81 MEDICATION MONITORING ENCOUNTER: ICD-10-CM

## 2023-12-19 DIAGNOSIS — M10.29 ACUTE DRUG-INDUCED GOUT OF MULTIPLE SITES: ICD-10-CM

## 2023-12-19 DIAGNOSIS — D69.6 THROMBOCYTOPENIA: ICD-10-CM

## 2023-12-19 DIAGNOSIS — Z23 NEEDS FLU SHOT: ICD-10-CM

## 2023-12-19 PROBLEM — E78.49 OTHER HYPERLIPIDEMIA: Status: ACTIVE | Noted: 2023-12-19

## 2023-12-19 PROBLEM — E78.49 OTHER HYPERLIPIDEMIA: Status: RESOLVED | Noted: 2023-12-19 | Resolved: 2023-12-19

## 2023-12-19 LAB
ALBUMIN SERPL-MCNC: 5.2 G/DL (ref 3.5–5.2)
ALBUMIN/GLOB SERPL: 2.2 G/DL
ALP SERPL-CCNC: 84 U/L (ref 39–117)
ALT SERPL W P-5'-P-CCNC: 29 U/L (ref 1–41)
ANION GAP SERPL CALCULATED.3IONS-SCNC: 13 MMOL/L (ref 5–15)
AST SERPL-CCNC: 22 U/L (ref 1–40)
BILIRUB SERPL-MCNC: 0.9 MG/DL (ref 0–1.2)
BUN SERPL-MCNC: 14 MG/DL (ref 6–20)
BUN/CREAT SERPL: 13 (ref 7–25)
CALCIUM SPEC-SCNC: 9.9 MG/DL (ref 8.6–10.5)
CHLORIDE SERPL-SCNC: 102 MMOL/L (ref 98–107)
CHOLEST SERPL-MCNC: 198 MG/DL (ref 0–200)
CO2 SERPL-SCNC: 26 MMOL/L (ref 22–29)
CREAT SERPL-MCNC: 1.08 MG/DL (ref 0.76–1.27)
EGFRCR SERPLBLD CKD-EPI 2021: 82.6 ML/MIN/1.73
EXPIRATION DATE: ABNORMAL
GLOBULIN UR ELPH-MCNC: 2.4 GM/DL
GLUCOSE SERPL-MCNC: 132 MG/DL (ref 65–99)
HBA1C MFR BLD: 6.3 % (ref 4.5–5.7)
HDLC SERPL-MCNC: 35 MG/DL (ref 40–60)
LDLC SERPL CALC-MCNC: 127 MG/DL (ref 0–100)
LDLC/HDLC SERPL: 3.5 {RATIO}
Lab: ABNORMAL
POTASSIUM SERPL-SCNC: 4 MMOL/L (ref 3.5–5.2)
PROT SERPL-MCNC: 7.6 G/DL (ref 6–8.5)
PSA SERPL-MCNC: 0.91 NG/ML (ref 0–4)
SODIUM SERPL-SCNC: 141 MMOL/L (ref 136–145)
TRIGL SERPL-MCNC: 202 MG/DL (ref 0–150)
TSH SERPL DL<=0.05 MIU/L-ACNC: 3.64 UIU/ML (ref 0.27–4.2)
URATE SERPL-MCNC: 8.2 MG/DL (ref 3.4–7)
VLDLC SERPL-MCNC: 36 MG/DL (ref 5–40)

## 2023-12-19 PROCEDURE — 85007 BL SMEAR W/DIFF WBC COUNT: CPT | Performed by: INTERNAL MEDICINE

## 2023-12-19 PROCEDURE — 84443 ASSAY THYROID STIM HORMONE: CPT | Performed by: INTERNAL MEDICINE

## 2023-12-19 PROCEDURE — G0103 PSA SCREENING: HCPCS | Performed by: INTERNAL MEDICINE

## 2023-12-19 PROCEDURE — 80053 COMPREHEN METABOLIC PANEL: CPT | Performed by: INTERNAL MEDICINE

## 2023-12-19 PROCEDURE — 82570 ASSAY OF URINE CREATININE: CPT | Performed by: INTERNAL MEDICINE

## 2023-12-19 PROCEDURE — 85025 COMPLETE CBC W/AUTO DIFF WBC: CPT | Performed by: INTERNAL MEDICINE

## 2023-12-19 PROCEDURE — 84550 ASSAY OF BLOOD/URIC ACID: CPT | Performed by: INTERNAL MEDICINE

## 2023-12-19 PROCEDURE — 82043 UR ALBUMIN QUANTITATIVE: CPT | Performed by: INTERNAL MEDICINE

## 2023-12-19 PROCEDURE — 36415 COLL VENOUS BLD VENIPUNCTURE: CPT | Performed by: INTERNAL MEDICINE

## 2023-12-19 PROCEDURE — 80061 LIPID PANEL: CPT | Performed by: INTERNAL MEDICINE

## 2023-12-19 RX ORDER — FLUOXETINE 10 MG/1
10 CAPSULE ORAL DAILY
Qty: 90 CAPSULE | Refills: 0 | Status: SHIPPED | OUTPATIENT
Start: 2023-12-19

## 2023-12-19 RX ORDER — PREDNISONE 10 MG/1
TABLET ORAL
Qty: 1 EACH | Refills: 0 | Status: SHIPPED | OUTPATIENT
Start: 2023-12-19

## 2023-12-19 RX ORDER — HYDROCODONE BITARTRATE AND ACETAMINOPHEN 5; 325 MG/1; MG/1
1 TABLET ORAL EVERY 6 HOURS PRN
Qty: 12 TABLET | Refills: 0 | Status: SHIPPED | OUTPATIENT
Start: 2023-12-19

## 2023-12-19 RX ORDER — ALLOPURINOL 200 MG/1
200 TABLET ORAL DAILY
Qty: 90 TABLET | Refills: 2 | Status: SHIPPED | OUTPATIENT
Start: 2023-12-19 | End: 2023-12-19 | Stop reason: SDUPTHER

## 2023-12-19 RX ORDER — ATORVASTATIN CALCIUM 20 MG/1
40 TABLET, FILM COATED ORAL DAILY
Qty: 30 TABLET | Refills: 0 | Status: SHIPPED | OUTPATIENT
Start: 2023-12-19 | End: 2023-12-21

## 2023-12-19 RX ORDER — AMLODIPINE BESYLATE 10 MG/1
10 TABLET ORAL DAILY
Qty: 90 TABLET | Refills: 0 | Status: SHIPPED | OUTPATIENT
Start: 2023-12-19

## 2023-12-19 NOTE — PROGRESS NOTES
"Laureano De La Vega  1970  [id number]  Patient Care Team:  Nikko Alcantar MD as PCP - General (Internal Medicine)    Laureano De La Vega is a 53 y.o. male here today for follow up.     This patient is accompanied by their self who contributes to the history of their care.    Chief Complaint:    Chief Complaint   Patient presents with    Diabetes    Hypertension        History of Present Illness:  I have reviewed and/or updated the patient's past medical, past surgical, family, social history, problem list and allergies as appropriate.   This gentleman has been lost to follow-up since March.  Has history of diabetes, dyslipidemia and hypertension.  He also on last blood work had low platelets he was to return for a citrated buffered platelet count but this was not done.  He has been having exacerbations of gout requiring pain medication as well as prednisone.  Weight is increased.  He has been off Mounjaro since last May. He did have some constipation with this which was painful. He did He has gained weight. He has not been working out lately. He does have a family history of diabetes. He did meet with nutrition and diabetic education during the pandemic. Gout falres have increased in frequency. He watches his red meet consumption. Some he cannot see the trigger. He has been on allopurinol in the past without side effects.       Review of Systems   Constitutional:  Positive for fatigue and unexpected weight gain.   Cardiovascular: Negative.    Gastrointestinal: Negative.    Endocrine: Negative for polydipsia and polyuria.   Musculoskeletal:  Positive for arthralgias.   Neurological: Negative.    Hematological: Negative.        Vitals:    12/19/23 1100   BP: 142/92   Pulse: 94   SpO2: 97%   Weight: 108 kg (237 lb 11.2 oz)   Height: 195.6 cm (77.01\")     Body mass index is 28.18 kg/m².    Physical Exam  Vitals and nursing note reviewed.   Constitutional:       General: He is not in acute distress.     Appearance: He is " well-developed. He is not diaphoretic.   HENT:      Head: Normocephalic and atraumatic.      Right Ear: External ear normal.      Left Ear: External ear normal.      Mouth/Throat:      Pharynx: No oropharyngeal exudate.   Eyes:      General: No scleral icterus.        Right eye: No discharge.      Conjunctiva/sclera: Conjunctivae normal.   Neck:      Thyroid: No thyromegaly.      Vascular: No JVD.      Trachea: No tracheal deviation.   Cardiovascular:      Rate and Rhythm: Normal rate and regular rhythm.      Heart sounds: Normal heart sounds.      Comments: PMI nondisplaced  Pulmonary:      Effort: Pulmonary effort is normal.      Breath sounds: Normal breath sounds. No wheezing or rales.   Abdominal:      General: Bowel sounds are normal.      Palpations: Abdomen is soft.      Tenderness: There is no abdominal tenderness. There is no guarding or rebound.   Musculoskeletal:      Cervical back: Normal range of motion and neck supple.   Lymphadenopathy:      Cervical: No cervical adenopathy.   Skin:     General: Skin is warm and dry.      Capillary Refill: Capillary refill takes less than 2 seconds.      Coloration: Skin is not pale.      Findings: No rash.   Neurological:      Mental Status: He is alert and oriented to person, place, and time.      Motor: No abnormal muscle tone.      Coordination: Coordination normal.   Psychiatric:         Judgment: Judgment normal.         Procedures    Results Review:    None.  His A1c is 6.3.    Assessment/Plan:    Problem List Items Addressed This Visit       Primary hypertension    Current Assessment & Plan     Hypertension is unchanged.  Continue current treatment regimen.  Dietary sodium restriction.  Weight loss.  Regular aerobic exercise.  Continue current medications.  Blood pressure will be reassessed at the next regular appointment.         Relevant Medications    amLODIPine (NORVASC) 10 MG tablet    Other Relevant Orders    Comprehensive Metabolic Panel (Completed)     TSH Rfx On Abnormal To Free T4 (Completed)    RESOLVED: Hyperlipemia    Relevant Medications    atorvastatin (LIPITOR) 20 MG tablet    Thrombocytopenia    Current Assessment & Plan     Repeat blood work was recommended at last visit however follow-up was delayed.  Repeat CBC today.         Relevant Orders    CBC w AUTO Differential (Completed)    Manual Differential (Completed)    Gout    Current Assessment & Plan     Recommended allopurinol 200 mg daily.  He notes dietary changes to be made.  Given prescription for prednisone as well as Norco in the event of an exacerbation during initiation.         Relevant Medications    allopurinol 200 MG tablet    predniSONE (DELTASONE) 10 MG (48) dose pack    HYDROcodone-acetaminophen (Norco) 5-325 MG per tablet    Other Relevant Orders    Uric acid (Completed)    Type 2 diabetes mellitus without complication, without long-term current use of insulin - Primary    Relevant Medications    Tirzepatide (Mounjaro) 2.5 MG/0.5ML solution pen-injector    Other Relevant Orders    POC Glycosylated Hemoglobin (Hb A1C) (Completed)    Microalbumin / Creatinine Urine Ratio - Urine, Clean Catch (Completed)     Other Visit Diagnoses       Medication monitoring encounter        Relevant Orders    Compliance Drug Analysis, Ur - Urine, Clean Catch    Needs flu shot        Relevant Orders    Fluzone >6 Months ([id number]) (Completed)    Prostate cancer screening        Relevant Orders    PSA Screen (Completed)    Screening for colon cancer        Relevant Orders    Ambulatory Referral For Screening Colonoscopy            Plan of care reviewed with patient at the conclusion of today's visit. Education was provided regarding diagnosis and management.  Patient verbalizes understanding of and agreement with management plan.    Return in about 3 months (around 3/19/2024) for dm/gout/htn.    Nikko Alcantar MD      Please note than portions of this note were completed wth a Voice Recognition  Program          Answers submitted by the patient for this visit:  Primary Reason for Visit (Submitted on 12/19/2023)  What is the primary reason for your visit?: Other  Other (Submitted on 12/19/2023)  Please describe your symptoms.: Following up since last visit  Have you had these symptoms before?: No  How long have you been having these symptoms?: Greater than 2 weeks

## 2023-12-20 PROBLEM — E78.5 HYPERLIPEMIA: Status: RESOLVED | Noted: 2021-11-17 | Resolved: 2023-12-20

## 2023-12-20 PROBLEM — E78.2 MIXED HYPERLIPIDEMIA: Status: ACTIVE | Noted: 2023-12-20

## 2023-12-20 LAB
ALBUMIN UR-MCNC: 1.5 MG/DL
CREAT UR-MCNC: 126 MG/DL
DEPRECATED RDW RBC AUTO: 39.5 FL (ref 37–54)
EOSINOPHIL # BLD MANUAL: 0.16 10*3/MM3 (ref 0–0.4)
EOSINOPHIL NFR BLD MANUAL: 2 % (ref 0.3–6.2)
ERYTHROCYTE [DISTWIDTH] IN BLOOD BY AUTOMATED COUNT: 13.1 % (ref 12.3–15.4)
HCT VFR BLD AUTO: 49.4 % (ref 37.5–51)
HGB BLD-MCNC: 17.4 G/DL (ref 13–17.7)
LYMPHOCYTES # BLD MANUAL: 2.94 10*3/MM3 (ref 0.7–3.1)
LYMPHOCYTES NFR BLD MANUAL: 7 % (ref 5–12)
MCH RBC QN AUTO: 29.8 PG (ref 26.6–33)
MCHC RBC AUTO-ENTMCNC: 35.2 G/DL (ref 31.5–35.7)
MCV RBC AUTO: 84.6 FL (ref 79–97)
MICROALBUMIN/CREAT UR: 11.9 MG/G (ref 0–29)
MONOCYTES # BLD: 0.57 10*3/MM3 (ref 0.1–0.9)
NEUTROPHILS # BLD AUTO: 4.49 10*3/MM3 (ref 1.7–7)
NEUTROPHILS NFR BLD MANUAL: 55 % (ref 42.7–76)
PLAT MORPH BLD: NORMAL
PLATELET # BLD AUTO: 92 10*3/MM3 (ref 140–450)
PMV BLD AUTO: ABNORMAL FL
RBC # BLD AUTO: 5.84 10*6/MM3 (ref 4.14–5.8)
RBC MORPH BLD: NORMAL
VARIANT LYMPHS NFR BLD MANUAL: 36 % (ref 19.6–45.3)
WBC MORPH BLD: NORMAL
WBC NRBC COR # BLD AUTO: 8.17 10*3/MM3 (ref 3.4–10.8)

## 2023-12-20 NOTE — ASSESSMENT & PLAN NOTE
Recommended allopurinol 200 mg daily.  He notes dietary changes to be made.  Given prescription for prednisone as well as Norco in the event of an exacerbation during initiation.

## 2023-12-21 DIAGNOSIS — D69.6 THROMBOCYTOPENIA: Primary | ICD-10-CM

## 2023-12-21 DIAGNOSIS — E78.5 DYSLIPIDEMIA: ICD-10-CM

## 2023-12-21 DIAGNOSIS — M10.29 ACUTE DRUG-INDUCED GOUT OF MULTIPLE SITES: ICD-10-CM

## 2023-12-21 RX ORDER — ALLOPURINOL 200 MG/1
200 TABLET ORAL DAILY
Qty: 90 TABLET | Refills: 2 | Status: SHIPPED | OUTPATIENT
Start: 2023-12-21 | End: 2023-12-21

## 2023-12-21 RX ORDER — ATORVASTATIN CALCIUM 40 MG/1
40 TABLET, FILM COATED ORAL DAILY
Qty: 90 TABLET | Refills: 2 | Status: SHIPPED | OUTPATIENT
Start: 2023-12-21

## 2023-12-21 RX ORDER — ATORVASTATIN CALCIUM 40 MG/1
40 TABLET, FILM COATED ORAL DAILY
Qty: 90 TABLET | Refills: 2 | Status: SHIPPED | OUTPATIENT
Start: 2023-12-21 | End: 2023-12-21 | Stop reason: SDUPTHER

## 2023-12-21 RX ORDER — ALLOPURINOL 100 MG/1
100 TABLET ORAL DAILY
Qty: 90 TABLET | Refills: 2 | Status: SHIPPED | OUTPATIENT
Start: 2023-12-21

## 2023-12-21 NOTE — TELEPHONE ENCOUNTER
Rx Refill Note  Requested Prescriptions     Pending Prescriptions Disp Refills    Allopurinol 200 MG tablet 90 tablet 2     Sig: Take 200 mg by mouth Daily.      Last office visit with prescribing clinician: 12/19/2023   Last telemedicine visit with prescribing clinician: Visit date not found   Next office visit with prescribing clinician: 3/20/2024                         Would you like a call back once the refill request has been completed: [] Yes [] No    If the office needs to give you a call back, can they leave a voicemail: [] Yes [] No    Josephine Morley MA  12/21/23, 10:39 EST

## 2023-12-21 NOTE — PROGRESS NOTES
Platlets continue to slowly decrease, I want to refer him to [facility] ( ref placed) uric acir high c/w his gout. ( We just started allopurinnol). Lipids worse. Attention to diet/exercise. Conitnue lipitor. Rest of labs ok

## 2023-12-27 LAB — DRUGS UR: NORMAL

## 2024-01-23 ENCOUNTER — HOSPITAL ENCOUNTER (OUTPATIENT)
Dept: ULTRASOUND IMAGING | Facility: HOSPITAL | Age: 54
Discharge: HOME | End: 2024-01-23
Payer: COMMERCIAL

## 2024-01-23 ENCOUNTER — LAB (OUTPATIENT)
Dept: LAB | Facility: HOSPITAL | Age: 54
End: 2024-01-23
Payer: COMMERCIAL

## 2024-01-23 ENCOUNTER — CONSULT (OUTPATIENT)
Dept: ONCOLOGY | Facility: CLINIC | Age: 54
End: 2024-01-23
Payer: COMMERCIAL

## 2024-01-23 VITALS
DIASTOLIC BLOOD PRESSURE: 97 MMHG | TEMPERATURE: 97.6 F | BODY MASS INDEX: 27.98 KG/M2 | SYSTOLIC BLOOD PRESSURE: 150 MMHG | RESPIRATION RATE: 18 BRPM | WEIGHT: 237 LBS | HEART RATE: 80 BPM | HEIGHT: 77 IN | OXYGEN SATURATION: 99 %

## 2024-01-23 DIAGNOSIS — D69.6 THROMBOCYTOPENIA: ICD-10-CM

## 2024-01-23 DIAGNOSIS — D69.6 THROMBOCYTOPENIA: Primary | ICD-10-CM

## 2024-01-23 LAB
ALBUMIN SERPL-MCNC: 4.9 G/DL (ref 3.5–5.2)
ALBUMIN/GLOB SERPL: 2 G/DL
ALP SERPL-CCNC: 97 U/L (ref 39–117)
ALT SERPL W P-5'-P-CCNC: 29 U/L (ref 1–41)
ANION GAP SERPL CALCULATED.3IONS-SCNC: 9 MMOL/L (ref 5–15)
AST SERPL-CCNC: 20 U/L (ref 1–40)
BASOPHILS # BLD AUTO: 0.05 10*3/MM3 (ref 0–0.2)
BASOPHILS NFR BLD AUTO: 0.7 % (ref 0–1.5)
BILIRUB SERPL-MCNC: 0.5 MG/DL (ref 0–1.2)
BUN SERPL-MCNC: 13 MG/DL (ref 6–20)
BUN/CREAT SERPL: 11.9 (ref 7–25)
CALCIUM SPEC-SCNC: 9.4 MG/DL (ref 8.6–10.5)
CHLORIDE SERPL-SCNC: 103 MMOL/L (ref 98–107)
CO2 SERPL-SCNC: 30 MMOL/L (ref 22–29)
CREAT SERPL-MCNC: 1.09 MG/DL (ref 0.76–1.27)
DEPRECATED RDW RBC AUTO: 40.9 FL (ref 37–54)
EGFRCR SERPLBLD CKD-EPI 2021: 81.7 ML/MIN/1.73
EOSINOPHIL # BLD AUTO: 0.2 10*3/MM3 (ref 0–0.4)
EOSINOPHIL NFR BLD AUTO: 2.9 % (ref 0.3–6.2)
ERYTHROCYTE [DISTWIDTH] IN BLOOD BY AUTOMATED COUNT: 13.1 % (ref 12.3–15.4)
FERRITIN SERPL-MCNC: 454.9 NG/ML (ref 30–400)
FOLATE SERPL-MCNC: 7.89 NG/ML (ref 4.78–24.2)
GLOBULIN UR ELPH-MCNC: 2.4 GM/DL
GLUCOSE SERPL-MCNC: 133 MG/DL (ref 65–99)
HAPTOGLOB SERPL-MCNC: 157 MG/DL (ref 30–200)
HCT VFR BLD AUTO: 46.1 % (ref 37.5–51)
HGB BLD-MCNC: 15.8 G/DL (ref 13–17.7)
HIV 1+2 AB+HIV1 P24 AG SERPL QL IA: NORMAL
IMM GRANULOCYTES # BLD AUTO: 0.01 10*3/MM3 (ref 0–0.05)
IMM GRANULOCYTES NFR BLD AUTO: 0.1 % (ref 0–0.5)
IRON 24H UR-MRATE: 81 MCG/DL (ref 59–158)
IRON SATN MFR SERPL: 19 % (ref 20–50)
LDH SERPL-CCNC: 295 U/L (ref 135–225)
LYMPHOCYTES # BLD AUTO: 2.39 10*3/MM3 (ref 0.7–3.1)
LYMPHOCYTES NFR BLD AUTO: 34.2 % (ref 19.6–45.3)
MCH RBC QN AUTO: 29.1 PG (ref 26.6–33)
MCHC RBC AUTO-ENTMCNC: 34.3 G/DL (ref 31.5–35.7)
MCV RBC AUTO: 84.9 FL (ref 79–97)
MONOCYTES # BLD AUTO: 0.63 10*3/MM3 (ref 0.1–0.9)
MONOCYTES NFR BLD AUTO: 9 % (ref 5–12)
NEUTROPHILS NFR BLD AUTO: 3.7 10*3/MM3 (ref 1.7–7)
NEUTROPHILS NFR BLD AUTO: 53.1 % (ref 42.7–76)
PLATELET # BLD AUTO: 88 10*3/MM3 (ref 140–450)
PMV BLD AUTO: 12.5 FL (ref 6–12)
POTASSIUM SERPL-SCNC: 4.2 MMOL/L (ref 3.5–5.2)
PROT SERPL-MCNC: 7.3 G/DL (ref 6–8.5)
RBC # BLD AUTO: 5.43 10*6/MM3 (ref 4.14–5.8)
SODIUM SERPL-SCNC: 142 MMOL/L (ref 136–145)
TIBC SERPL-MCNC: 422 MCG/DL (ref 298–536)
TRANSFERRIN SERPL-MCNC: 283 MG/DL (ref 200–360)
VIT B12 BLD-MCNC: 422 PG/ML (ref 211–946)
WBC NRBC COR # BLD AUTO: 6.98 10*3/MM3 (ref 3.4–10.8)

## 2024-01-23 PROCEDURE — 36415 COLL VENOUS BLD VENIPUNCTURE: CPT

## 2024-01-23 PROCEDURE — G0432 EIA HIV-1/HIV-2 SCREEN: HCPCS

## 2024-01-23 PROCEDURE — 85060 BLOOD SMEAR INTERPRETATION: CPT

## 2024-01-23 PROCEDURE — 82728 ASSAY OF FERRITIN: CPT

## 2024-01-23 PROCEDURE — 83615 LACTATE (LD) (LDH) ENZYME: CPT

## 2024-01-23 PROCEDURE — 99204 OFFICE O/P NEW MOD 45 MIN: CPT | Performed by: INTERNAL MEDICINE

## 2024-01-23 PROCEDURE — 83010 ASSAY OF HAPTOGLOBIN QUANT: CPT

## 2024-01-23 PROCEDURE — 80053 COMPREHEN METABOLIC PANEL: CPT

## 2024-01-23 PROCEDURE — 82746 ASSAY OF FOLIC ACID SERUM: CPT

## 2024-01-23 PROCEDURE — 76700 US EXAM ABDOM COMPLETE: CPT

## 2024-01-23 PROCEDURE — 82607 VITAMIN B-12: CPT

## 2024-01-23 PROCEDURE — 84466 ASSAY OF TRANSFERRIN: CPT

## 2024-01-23 PROCEDURE — 85025 COMPLETE CBC W/AUTO DIFF WBC: CPT

## 2024-01-23 PROCEDURE — 83540 ASSAY OF IRON: CPT

## 2024-01-23 NOTE — PROGRESS NOTES
New Patient Office Visit      Date: 2024     Patient Name: Laureano De La Vega  MRN: [id number]  : 1970  Referring Physician: Nikko Alcantar    Chief Complaint: Establish care for thrombocytopenia    History of Present Illness: Laureano De La Vega is a pleasant 53 y.o. male past medical history of hypertension, hyperlipidemia, anxiety who presents today for evaluation of thrombocytopenia. The patient has been followed by the PCP who is monitoring CBCs which has been notable for mild thrombocytopenia over the past 2 years.  Platelet count is ranged tween 90K-140K during this time.  He denies any easy bleeding or bruising episodes.  Denies any petechiae.  Denies any family history of low platelets.  Denies any new medication adjustments or changes.  Otherwise feels well and has no major concerns or complaints    Oncology History:    Oncology/Hematology History    No history exists.       Subjective      Review of Systems:     Constitutional: Negative for fevers, chills, or weight loss  Eyes: Negative for blurred vision or discharge         Ear/Nose/Throat: Negative for difficulty swallowing, sore throat, LAD                                                       Respiratory: Negative for cough, SOA, wheezing                                                                                        Cardiovascular: Negative for chest pain or palpitations                                                                  Gastrointestinal: Negative for nausea, vomiting or diarrhea                                                                     Genitourinary: Negative for dysuria or hematuria                                                                                           Musculoskeletal: Negative for any joint pains or muscle aches                                                                        Neurologic: Negative for any weakness, headaches, dizziness                                                         "                 Hematologic: Negative for any easy bleeding or bruising                                                                                   Psychiatric: Negative for anxiety or depression                             Past Medical History:   Past Medical History:   Diagnosis Date    Arthritis 1990    gout    Depression     Diabetes mellitus     previously diagnosed with pre-diabetes    GERD (gastroesophageal reflux disease)     Gout     Hyperlipidemia     Hypertension        Past Surgical History: History reviewed. No pertinent surgical history.    Family History:   Family History   Problem Relation Age of Onset    Alcohol abuse Mother     Arthritis Father     Hypertension Father     Diabetes Father     Hyperlipidemia Father     COPD Father     Sleep apnea Father     Heart disease Paternal Grandfather         chf    Cancer Neg Hx     Stroke Neg Hx        Social History:   Social History     Socioeconomic History    Marital status:    Tobacco Use    Smoking status: Never    Smokeless tobacco: Never   Vaping Use    Vaping Use: Never used   Substance and Sexual Activity    Alcohol use: Not Currently     Alcohol/week: 0.0 standard drinks of alcohol    Drug use: Never    Sexual activity: Not Currently     Partners: Female     Birth control/protection: Coitus interruptus, Condom       Medications:     Current Outpatient Medications:     amLODIPine (NORVASC) 10 MG tablet, Take 1 tablet by mouth Daily., Disp: 90 tablet, Rfl: 0    atorvastatin (LIPITOR) 40 MG tablet, Take 1 tablet by mouth Daily., Disp: 90 tablet, Rfl: 2    FLUoxetine (PROzac) 10 MG capsule, Take 1 capsule by mouth Daily., Disp: 90 capsule, Rfl: 0    Allergies:   No Known Allergies    Objective     Physical Exam:  Vital Signs:   Vitals:    01/23/24 1110   BP: 150/97   Pulse: 80   Resp: 18   Temp: 97.6 °F (36.4 °C)   SpO2: 99%   Weight: 108 kg (237 lb)   Height: 195.6 cm (77\")   PainSc: 0-No pain     Pain Score    01/23/24 1110   PainSc: " 0-No pain     ECOG Performance Status: 0 - Asymptomatic    Constitutional: NAD, ECOG 0  Eyes: PERRLA, scleral anicteric  ENT: No LAD, no thyromegaly  Respiratory: CTAB, no wheezing, rales, rhonchi  Cardiovascular: RRR, no murmurs, pulses 2+ bilaterally  Abdomen: soft, NT/ND, no HSM  Musculoskeletal: strength 5/5 bilaterally, no c/c/e  Neurologic: A&O x 3, CN II-XII intact grossly  Psych: mood and affect congruent, no SI or HI    Results Review:   No visits with results within 2 Week(s) from this visit.   Latest known visit with results is:   Orders Only on 12/19/2023   Component Date Value Ref Range Status    Report Summary 12/19/2023 FINAL   Final    Comment: ====================================================================  [facility] DRUG ANALYSIS,[facility]  ====================================================================  Test                             Result       Flag       Units  Drug Present and Declared for Prescription Verification    Fluoxetine                     PRESENT      EXPECTED    Norfluoxetine                  PRESENT      EXPECTED     Norfluoxetine is an expected metabolite of fluoxetine.  Drug Absent but Declared for Prescription Verification    Hydrocodone                    Not Detected UNEXPECTED ng/mg creat    Acetaminophen                  Not Detected UNEXPECTED     Acetaminophen, as indicated in the declared medication list, is     not always detected even when used as directed.  ====================================================================  Test                      Result    Flag   Units      Ref Range    Creatinine              125              mg/dL      >=20  ====================================                           ================================  Declared Medications:   The flagging and interpretation on this report are based on the   following declared medications.  Unexpected results may arise from   inaccuracies in the declared medications.   **Note: The  testing scope of this panel includes these medications:   Fluoxetine   Hydrocodone (Hydrocodone-Acetaminophen)   **Note: The testing scope of this panel does not include small to   moderate amounts of these reported medications:   Acetaminophen (Hydrocodone-Acetaminophen)   **Note: The testing scope of this panel does not include following   reported medications:   Amlodipine   Atorvastatin   Tirzepatide  ====================================================================  For clinical consultation, please call [phone].  ====================================================================         No results found.    Assessment / Plan      Assessment/Plan:   1. Thrombocytopenia (Primary)  -Unclear etiology at this time  -Platelet count range between 90K-140K over the past several lab draws  -We will check labs as below to rule out a secondary causes  -Will check ultrasound of the abdomen to rule out liver or spleen disorders  -No indication for bone marrow biopsy at this time  -     CBC & Differential; Future  -     Comprehensive Metabolic Panel; Future  -     Ferritin; Future  -     Folate; Future  -     Iron Profile; Future  -     Lactate Dehydrogenase; Future  -     Haptoglobin; Future  -     Vitamin B12; Future  -     Peripheral Blood Smear; Future  -     HIV-1 / O / 2 Ag / Antibody; Future  -     US Abdomen Complete; Future           Follow Up:   Follow-up after ultrasound     Ton Trammell MD  Hematology and Oncology     Please note that portions of this note may have been completed with a voice recognition program. Efforts were made to edit the dictations, but occasionally words are mistranscribed.

## 2024-01-24 LAB
CYTOLOGIST CVX/VAG CYTO: NORMAL
PATH INTERP BLD-IMP: NORMAL

## 2024-01-30 ENCOUNTER — OFFICE VISIT (OUTPATIENT)
Dept: ONCOLOGY | Facility: CLINIC | Age: 54
End: 2024-01-30
Payer: COMMERCIAL

## 2024-01-30 ENCOUNTER — RESEARCH ENCOUNTER (OUTPATIENT)
Dept: OTHER | Facility: OTHER | Age: 54
End: 2024-01-30
Payer: COMMERCIAL

## 2024-01-30 VITALS
SYSTOLIC BLOOD PRESSURE: 152 MMHG | OXYGEN SATURATION: 96 % | WEIGHT: 237 LBS | BODY MASS INDEX: 27.98 KG/M2 | DIASTOLIC BLOOD PRESSURE: 92 MMHG | TEMPERATURE: 98.1 F | RESPIRATION RATE: 18 BRPM | HEIGHT: 77 IN | HEART RATE: 80 BPM

## 2024-01-30 DIAGNOSIS — D69.6 THROMBOCYTOPENIA: Primary | ICD-10-CM

## 2024-01-30 PROCEDURE — 99214 OFFICE O/P EST MOD 30 MIN: CPT | Performed by: INTERNAL MEDICINE

## 2024-01-30 NOTE — PROGRESS NOTES
Follow Up Office Visit      Date: 2024     Patient Name: Laureano De La Vega Jr.  MRN: [id number]  : 1970  Referring Physician: Nikko Alcantar     Chief Complaint: Follow-up for thrombocytopenia     History of Present Illness: Laureano De La Vega is a pleasant 53 y.o. male past medical history of hypertension, hyperlipidemia, anxiety who presents today for evaluation of thrombocytopenia. The patient has been followed by the PCP who is monitoring CBCs which has been notable for mild thrombocytopenia over the past 2 years.  Platelet count is ranged tween 90K-140K during this time.  He denies any easy bleeding or bruising episodes.  Denies any petechiae.  Denies any family history of low platelets.  Denies any new medication adjustments or changes.  Otherwise feels well and has no major concerns or complaints    Interval History:  Presents clinic for follow-up.  No major concerns or complaints today.  Denies any easy bleeding or bruising episodes.  Denies any petechiae    Oncology History:    Oncology/Hematology History    No history exists.       Subjective      Review of Systems:   Constitutional: Negative for fevers, chills, or weight loss  Eyes: Negative for blurred vision or discharge         Ear/Nose/Throat: Negative for difficulty swallowing, sore throat, LAD                                                       Respiratory: Negative for cough, SOA, wheezing                                                                                        Cardiovascular: Negative for chest pain or palpitations                                                                  Gastrointestinal: Negative for nausea, vomiting or diarrhea                                                                     Genitourinary: Negative for dysuria or hematuria                                                                                           Musculoskeletal: Negative for any joint pains or muscle aches                          "                                               Neurologic: Negative for any weakness, headaches, dizziness                                                                         Hematologic: Negative for any easy bleeding or bruising                                                                                   Psychiatric: Negative for anxiety or depression                          Past Medical History/Past Surgical History/ Family History/ Social History: Reviewed by me and unchanged from my previous documentation done on January 2024.     Medications:     Current Outpatient Medications:     amLODIPine (NORVASC) 10 MG tablet, Take 1 tablet by mouth Daily., Disp: 90 tablet, Rfl: 0    atorvastatin (LIPITOR) 40 MG tablet, Take 1 tablet by mouth Daily., Disp: 90 tablet, Rfl: 2    FLUoxetine (PROzac) 10 MG capsule, Take 1 capsule by mouth Daily., Disp: 90 capsule, Rfl: 0    Allergies:   No Known Allergies    Objective     Physical Exam:  Vital Signs:   Vitals:    01/30/24 1019   BP: 152/92   Pulse: 80   Resp: 18   Temp: 98.1 °F (36.7 °C)   SpO2: 96%   Weight: 108 kg (237 lb)   Height: 195.6 cm (77\")   PainSc: 0-No pain     Pain Score    01/30/24 1019   PainSc: 0-No pain     ECOG Performance Status: 0 - Asymptomatic    Constitutional: NAD, ECOG 0  Eyes: PERRLA, scleral anicteric  ENT: No LAD, no thyromegaly  Respiratory: CTAB, no wheezing, rales, rhonchi  Cardiovascular: RRR, no murmurs, pulses 2+ bilaterally  Abdomen: soft, NT/ND, no HSM  Musculoskeletal: strength 5/5 bilaterally, no c/c/e  Neurologic: A&O x 3, CN II-XII intact grossly    Results Review:   Lab on 01/23/2024   Component Date Value Ref Range Status    Glucose 01/23/2024 133 (H)  65 - 99 mg/dL Final    BUN 01/23/2024 13  6 - 20 mg/dL Final    Creatinine 01/23/2024 1.09  0.76 - 1.27 mg/dL Final    Sodium 01/23/2024 142  136 - 145 mmol/L Final    Potassium 01/23/2024 4.2  3.5 - 5.2 mmol/L Final    Slight hemolysis detected by analyzer. Result may be " falsely elevated.    Chloride 01/23/2024 103  98 - 107 mmol/L Final    CO2 01/23/2024 30.0 (H)  22.0 - 29.0 mmol/L Final    Calcium 01/23/2024 9.4  8.6 - 10.5 mg/dL Final    Total Protein 01/23/2024 7.3  6.0 - 8.5 g/dL Final    Albumin 01/23/2024 4.9  3.5 - 5.2 g/dL Final    ALT (SGPT) 01/23/2024 29  1 - 41 U/L Final    AST (SGOT) 01/23/2024 20  1 - 40 U/L Final    Alkaline Phosphatase 01/23/2024 97  39 - 117 U/L Final    Total Bilirubin 01/23/2024 0.5  0.0 - 1.2 mg/dL Final    Globulin 01/23/2024 2.4  gm/dL Final    Calculated Result    A/G Ratio 01/23/2024 2.0  g/dL Final    BUN/Creatinine Ratio 01/23/2024 11.9  7.0 - 25.0 Final    Anion Gap 01/23/2024 9.0  5.0 - 15.0 mmol/L Final    eGFR 01/23/2024 81.7  >60.0 mL/min/1.73 Final    Ferritin 01/23/2024 454.90 (H)  30.00 - 400.00 ng/mL Final    Folate 01/23/2024 7.89  4.78 - 24.20 ng/mL Final    Iron 01/23/2024 81  59 - 158 mcg/dL Final    Iron Saturation (TSAT) 01/23/2024 19 (L)  20 - 50 % Final    Transferrin 01/23/2024 283  200 - 360 mg/dL Final    TIBC 01/23/2024 422  298 - 536 mcg/dL Final    LDH 01/23/2024 295 (H)  135 - 225 U/L Final    Haptoglobin 01/23/2024 157  30 - 200 mg/dL Final    Specimen hemolyzed. Results may be affected.    Vitamin B-12 01/23/2024 422  211 - 946 pg/mL Final    Performed by: 01/23/2024 Shnatanu Vargas MD   Final    Pathologist Interpretation 01/23/2024    Final                    Value:Normal total white blood cell count with differential compatible with the automated differential reported.  No abnormal/immature white blood cells noted.  Normochromic normocytic red blood cells without anemia.  Thrombocytopenia with normal platelet appearance.  No significant platelet clumping identified.    HIV-1/ HIV-2 01/23/2024 Non-Reactive  Non-Reactive Final    WBC 01/23/2024 6.98  3.40 - 10.80 10*3/mm3 Final    RBC 01/23/2024 5.43  4.14 - 5.80 10*6/mm3 Final    Hemoglobin 01/23/2024 15.8  13.0 - 17.7 g/dL Final    Hematocrit 01/23/2024 46.1   37.5 - 51.0 % Final    MCV 01/23/2024 84.9  79.0 - 97.0 fL Final    MCH 01/23/2024 29.1  26.6 - 33.0 pg Final    MCHC 01/23/2024 34.3  31.5 - 35.7 g/dL Final    RDW 01/23/2024 13.1  12.3 - 15.4 % Final    RDW-SD 01/23/2024 40.9  37.0 - 54.0 fl Final    MPV 01/23/2024 12.5 (H)  6.0 - 12.0 fL Final    Platelets 01/23/2024 88 (L)  140 - 450 10*3/mm3 Final    Neutrophil % 01/23/2024 53.1  42.7 - 76.0 % Final    Lymphocyte % 01/23/2024 34.2  19.6 - 45.3 % Final    Monocyte % 01/23/2024 9.0  5.0 - 12.0 % Final    Eosinophil % 01/23/2024 2.9  0.3 - 6.2 % Final    Basophil % 01/23/2024 0.7  0.0 - 1.5 % Final    Immature Grans % 01/23/2024 0.1  0.0 - 0.5 % Final    Neutrophils, Absolute 01/23/2024 3.70  1.70 - 7.00 10*3/mm3 Final    Lymphocytes, Absolute 01/23/2024 2.39  0.70 - 3.10 10*3/mm3 Final    Monocytes, Absolute 01/23/2024 0.63  0.10 - 0.90 10*3/mm3 Final    Eosinophils, Absolute 01/23/2024 0.20  0.00 - 0.40 10*3/mm3 Final    Basophils, Absolute 01/23/2024 0.05  0.00 - 0.20 10*3/mm3 Final    Immature Grans, Absolute 01/23/2024 0.01  0.00 - 0.05 10*3/mm3 Final       US Abdomen Complete    Result Date: 1/24/2024  Narrative: US ABDOMEN COMPLETE Date of Exam: 1/23/2024 12:54 PM EST Indication: thrombocytopenia. Comparison: No comparisons available. Technique: Routine gray scale and color Doppler imaging of the complete abdomen was performed according to routine protocol. Findings: LIVER:  The liver appears normal in echogenicity.No focal lesions identified. Normal flow is present within the hepatic vasculature. SPLEEN: Mildly enlarged measuring 13.6 x 4.0 x 4.4 cm. GALLBLADDER: Normal in appearance. Common bile duct is not dilated, measuring 2.5 mm. PANCREAS:  Visualized portions are unremarkable. AORTA/IVC:  Visualized portions are unremarkable. KIDNEYS:  Normal in size with no focal lesions. No evidence of nephrolithiasis or hydronephrosis.     Impression: Impression: Splenomegaly. Electronically Signed: Esmer Billingsley  MD  1/24/2024 9:50 AM EST  Workstation ID: [id number]     Assessment / Plan      Assessment/Plan:   1. Thrombocytopenia (Primary)  -Platelet count range between 90K-140K over the past several lab draws  -Repeat platelet count 80 K in January 2024  -LDH mildly elevated, however haptoglobin within normal limits indicating a low concern for hemolysis  -Iron studies, vitamin B12, folate within normal limits  -Peripheral smear without platelet clumping  -Ultrasound of the abdomen only notable for some mild splenomegaly  -At this point in time he likely has ITP.  No indication for treatment as his platelet count drops below 30K or if he were to have significant bleeding episode  -Will plan to repeat a CBC in 6 months.  Ordered today         Follow Up:   Follow-up in 6 months     Ton Trammell MD  Hematology and Oncology     Please note that portions of this note may have been completed with a voice recognition program. Efforts were made to edit the dictations, but occasionally words are mistranscribed.

## 2024-01-30 NOTE — RESEARCH
Patient Name:  Laureano De La Vega Jr.  YOB: 1970  Patient Age:  53 y.o.  Patient's Sex:  male    Date of Service:  01/30/2024    Provider:  CHEN Trammell MD                     RESEARCH NOTE                  Protocol:  ASCEND2 / [id number]  Subject ID#:  [id number]    Information (all 8 elements of the ICF) concerning the protocol including, description of procedures to be followed, participant responsibilities, confidentiality, foreseeable risks, compensation, availability, benefits and alternatives to participation, was reviewed with the research participant in an understandable language. The participant was encouraged to ask questions throughout the informed consent process. Any questions and/or concerns were answered to the participant's satisfaction.    After giving the participant time to consider, the participant chose to voluntarily participate in the study. The informed consent document signature process was completed.    Participant authorization for the use and disclosure of protected health information for research purposes (HIPAA Privacy Rule) was acknowledged and signed on the date noted on the consent form.    Contact information for the research department and physician/investigator was provided. A copy of the signed informed consent form was given to the study participant.    No study specific assessments were completed prior to obtaining informed consent.    By signing this document, I attest that I participated in the informed consent discussion with the participant.    Thank you,  Alis Miller, RN, BSN, CRC   [FreeTextEntry1] : I rec therapy\par Return in 4 weeks

## 2024-02-05 DIAGNOSIS — I10 PRIMARY HYPERTENSION: ICD-10-CM

## 2024-02-05 DIAGNOSIS — E78.5 DYSLIPIDEMIA: ICD-10-CM

## 2024-02-05 RX ORDER — AMLODIPINE BESYLATE 10 MG/1
10 TABLET ORAL DAILY
Qty: 90 TABLET | Refills: 0 | Status: SHIPPED | OUTPATIENT
Start: 2024-02-05

## 2024-02-05 RX ORDER — ATORVASTATIN CALCIUM 40 MG/1
40 TABLET, FILM COATED ORAL DAILY
Qty: 90 TABLET | Refills: 1 | Status: SHIPPED | OUTPATIENT
Start: 2024-02-05

## 2024-02-05 RX ORDER — FLUOXETINE 10 MG/1
10 CAPSULE ORAL DAILY
Qty: 90 CAPSULE | Refills: 0 | Status: SHIPPED | OUTPATIENT
Start: 2024-02-05

## 2024-04-07 DIAGNOSIS — I10 PRIMARY HYPERTENSION: ICD-10-CM

## 2024-04-08 RX ORDER — FLUOXETINE 10 MG/1
10 CAPSULE ORAL DAILY
Qty: 90 CAPSULE | Refills: 3 | Status: SHIPPED | OUTPATIENT
Start: 2024-04-08

## 2024-04-08 RX ORDER — AMLODIPINE BESYLATE 10 MG/1
10 TABLET ORAL DAILY
Qty: 90 TABLET | Refills: 3 | Status: SHIPPED | OUTPATIENT
Start: 2024-04-08

## 2024-04-08 NOTE — TELEPHONE ENCOUNTER
Rx Refill Note  Requested Prescriptions     Pending Prescriptions Disp Refills    FLUoxetine (PROzac) 10 MG capsule [Pharmacy Med Name: FLUoxetine HCl 10 MG Oral Capsule] 90 capsule 3     Sig: TAKE 1 CAPSULE BY MOUTH DAILY    amLODIPine (NORVASC) 10 MG tablet [Pharmacy Med Name: amLODIPine Besylate 10 MG Oral Tablet] 90 tablet 3     Sig: TAKE 1 TABLET BY MOUTH DAILY      Last office visit with prescribing clinician: 12/19/2023   Last telemedicine visit with prescribing clinician: Visit date not found   Next office visit with prescribing clinician: Visit date not found                         Would you like a call back once the refill request has been completed: [] Yes [] No    If the office needs to give you a call back, can they leave a voicemail: [] Yes [] No    Jayla Tay MA  04/08/24, 07:56 EDT

## 2024-08-25 DIAGNOSIS — E78.5 DYSLIPIDEMIA: ICD-10-CM

## 2024-08-26 RX ORDER — ATORVASTATIN CALCIUM 40 MG/1
40 TABLET, FILM COATED ORAL DAILY
Qty: 90 TABLET | Refills: 0 | Status: SHIPPED | OUTPATIENT
Start: 2024-08-26

## 2024-12-06 DIAGNOSIS — E78.5 DYSLIPIDEMIA: ICD-10-CM

## 2024-12-06 RX ORDER — ATORVASTATIN CALCIUM 40 MG/1
40 TABLET, FILM COATED ORAL DAILY
Qty: 90 TABLET | Refills: 3 | Status: SHIPPED | OUTPATIENT
Start: 2024-12-06

## 2024-12-06 NOTE — TELEPHONE ENCOUNTER
Rx Refill Note  Requested Prescriptions     Pending Prescriptions Disp Refills    atorvastatin (LIPITOR) 40 MG tablet [Pharmacy Med Name: Atorvastatin Calcium 40 MG Oral Tablet] 90 tablet 3     Sig: TAKE 1 TABLET BY MOUTH DAILY      Last office visit with prescribing clinician: 12/19/2023   Last telemedicine visit with prescribing clinician: Visit date not found   Next office visit with prescribing clinician: Visit date not found                         Would you like a call back once the refill request has been completed: [] Yes [] No    If the office needs to give you a call back, can they leave a voicemail: [] Yes [] No    Jesica Hernández MA  12/06/24, 09:00 EST

## 2025-04-21 DIAGNOSIS — I10 PRIMARY HYPERTENSION: ICD-10-CM

## 2025-04-21 RX ORDER — FLUOXETINE 10 MG/1
10 CAPSULE ORAL DAILY
Qty: 90 CAPSULE | Refills: 3 | Status: SHIPPED | OUTPATIENT
Start: 2025-04-21

## 2025-04-21 RX ORDER — AMLODIPINE BESYLATE 10 MG/1
10 TABLET ORAL DAILY
Qty: 90 TABLET | Refills: 3 | Status: SHIPPED | OUTPATIENT
Start: 2025-04-21

## 2025-04-21 NOTE — TELEPHONE ENCOUNTER
Rx Refill Note  Requested Prescriptions     Pending Prescriptions Disp Refills    amLODIPine (NORVASC) 10 MG tablet [Pharmacy Med Name: amLODIPine Besylate 10 MG Oral Tablet] 90 tablet 3     Sig: TAKE 1 TABLET BY MOUTH DAILY    FLUoxetine (PROzac) 10 MG capsule [Pharmacy Med Name: FLUoxetine HCl 10 MG Oral Capsule] 90 capsule 3     Sig: TAKE 1 CAPSULE BY MOUTH DAILY      Last office visit with prescribing clinician: 12/19/2023   Last telemedicine visit with prescribing clinician: Visit date not found   Next office visit with prescribing clinician: Visit date not found                         Would you like a call back once the refill request has been completed: [] Yes [] No    If the office needs to give you a call back, can they leave a voicemail: [] Yes [] No    Josephine Morley MA  04/21/25, 10:27 EDT

## 2025-05-15 ENCOUNTER — LAB (OUTPATIENT)
Dept: LAB | Facility: HOSPITAL | Age: 55
End: 2025-05-15
Payer: COMMERCIAL

## 2025-05-15 ENCOUNTER — OFFICE VISIT (OUTPATIENT)
Dept: FAMILY MEDICINE CLINIC | Facility: CLINIC | Age: 55
End: 2025-05-15
Payer: COMMERCIAL

## 2025-05-15 VITALS
HEART RATE: 76 BPM | SYSTOLIC BLOOD PRESSURE: 122 MMHG | BODY MASS INDEX: 28.69 KG/M2 | DIASTOLIC BLOOD PRESSURE: 70 MMHG | TEMPERATURE: 98.4 F | OXYGEN SATURATION: 99 % | WEIGHT: 243 LBS | HEIGHT: 77 IN

## 2025-05-15 DIAGNOSIS — R05.3 CHRONIC COUGH: ICD-10-CM

## 2025-05-15 DIAGNOSIS — E11.9 TYPE 2 DIABETES MELLITUS WITHOUT COMPLICATION, WITHOUT LONG-TERM CURRENT USE OF INSULIN: ICD-10-CM

## 2025-05-15 DIAGNOSIS — I10 PRIMARY HYPERTENSION: ICD-10-CM

## 2025-05-15 DIAGNOSIS — F32.4 MAJOR DEPRESSIVE DISORDER WITH SINGLE EPISODE, IN PARTIAL REMISSION: ICD-10-CM

## 2025-05-15 DIAGNOSIS — E78.2 MIXED HYPERLIPIDEMIA: ICD-10-CM

## 2025-05-15 DIAGNOSIS — Z12.11 COLON CANCER SCREENING: ICD-10-CM

## 2025-05-15 DIAGNOSIS — M10.9 ACUTE GOUT OF MULTIPLE SITES, UNSPECIFIED CAUSE: ICD-10-CM

## 2025-05-15 DIAGNOSIS — I10 PRIMARY HYPERTENSION: Primary | ICD-10-CM

## 2025-05-15 DIAGNOSIS — K21.9 GASTROESOPHAGEAL REFLUX DISEASE, UNSPECIFIED WHETHER ESOPHAGITIS PRESENT: ICD-10-CM

## 2025-05-15 LAB
ALBUMIN SERPL-MCNC: 4.4 G/DL (ref 3.5–5.2)
ALBUMIN/GLOB SERPL: 1.5 G/DL
ALP SERPL-CCNC: 102 U/L (ref 39–117)
ALT SERPL W P-5'-P-CCNC: 33 U/L (ref 1–41)
ANION GAP SERPL CALCULATED.3IONS-SCNC: 10 MMOL/L (ref 5–15)
AST SERPL-CCNC: 25 U/L (ref 1–40)
BASOPHILS # BLD AUTO: 0.03 10*3/MM3 (ref 0–0.2)
BASOPHILS NFR BLD AUTO: 0.4 % (ref 0–1.5)
BILIRUB SERPL-MCNC: 0.8 MG/DL (ref 0–1.2)
BUN SERPL-MCNC: 13 MG/DL (ref 6–20)
BUN/CREAT SERPL: 17.8 (ref 7–25)
CALCIUM SPEC-SCNC: 9.1 MG/DL (ref 8.6–10.5)
CHLORIDE SERPL-SCNC: 102 MMOL/L (ref 98–107)
CHOLEST SERPL-MCNC: 176 MG/DL (ref 0–200)
CO2 SERPL-SCNC: 25 MMOL/L (ref 22–29)
CREAT SERPL-MCNC: 0.73 MG/DL (ref 0.76–1.27)
DEPRECATED RDW RBC AUTO: 41.1 FL (ref 37–54)
EGFRCR SERPLBLD CKD-EPI 2021: 108.8 ML/MIN/1.73
EOSINOPHIL # BLD AUTO: 0.16 10*3/MM3 (ref 0–0.4)
EOSINOPHIL NFR BLD AUTO: 2 % (ref 0.3–6.2)
ERYTHROCYTE [DISTWIDTH] IN BLOOD BY AUTOMATED COUNT: 13.3 % (ref 12.3–15.4)
GLOBULIN UR ELPH-MCNC: 2.9 GM/DL
GLUCOSE SERPL-MCNC: 163 MG/DL (ref 65–99)
HBA1C MFR BLD: 7.8 % (ref 4.8–5.6)
HCT VFR BLD AUTO: 46.7 % (ref 37.5–51)
HDLC SERPL-MCNC: 32 MG/DL (ref 40–60)
HGB BLD-MCNC: 16.3 G/DL (ref 13–17.7)
IMM GRANULOCYTES # BLD AUTO: 0.05 10*3/MM3 (ref 0–0.05)
IMM GRANULOCYTES NFR BLD AUTO: 0.6 % (ref 0–0.5)
LDLC SERPL CALC-MCNC: 110 MG/DL (ref 0–100)
LDLC/HDLC SERPL: 3.31 {RATIO}
LYMPHOCYTES # BLD AUTO: 2.3 10*3/MM3 (ref 0.7–3.1)
LYMPHOCYTES NFR BLD AUTO: 29.4 % (ref 19.6–45.3)
MCH RBC QN AUTO: 29.8 PG (ref 26.6–33)
MCHC RBC AUTO-ENTMCNC: 34.9 G/DL (ref 31.5–35.7)
MCV RBC AUTO: 85.4 FL (ref 79–97)
MONOCYTES # BLD AUTO: 0.83 10*3/MM3 (ref 0.1–0.9)
MONOCYTES NFR BLD AUTO: 10.6 % (ref 5–12)
NEUTROPHILS NFR BLD AUTO: 4.44 10*3/MM3 (ref 1.7–7)
NEUTROPHILS NFR BLD AUTO: 57 % (ref 42.7–76)
PLATELET # BLD AUTO: 82 10*3/MM3 (ref 140–450)
POTASSIUM SERPL-SCNC: 4.1 MMOL/L (ref 3.5–5.2)
PROT SERPL-MCNC: 7.3 G/DL (ref 6–8.5)
RBC # BLD AUTO: 5.47 10*6/MM3 (ref 4.14–5.8)
SODIUM SERPL-SCNC: 137 MMOL/L (ref 136–145)
TRIGL SERPL-MCNC: 191 MG/DL (ref 0–150)
URATE SERPL-MCNC: 8.1 MG/DL (ref 3.4–7)
VLDLC SERPL-MCNC: 34 MG/DL (ref 5–40)
WBC NRBC COR # BLD AUTO: 7.81 10*3/MM3 (ref 3.4–10.8)

## 2025-05-15 PROCEDURE — 84550 ASSAY OF BLOOD/URIC ACID: CPT

## 2025-05-15 PROCEDURE — 83036 HEMOGLOBIN GLYCOSYLATED A1C: CPT

## 2025-05-15 PROCEDURE — 85025 COMPLETE CBC W/AUTO DIFF WBC: CPT

## 2025-05-15 PROCEDURE — 80061 LIPID PANEL: CPT

## 2025-05-15 PROCEDURE — 80053 COMPREHEN METABOLIC PANEL: CPT

## 2025-05-15 RX ORDER — FAMOTIDINE 20 MG/1
20 TABLET, FILM COATED ORAL 2 TIMES DAILY
Qty: 60 TABLET | Refills: 3 | Status: SHIPPED | OUTPATIENT
Start: 2025-05-15

## 2025-05-15 RX ORDER — ATORVASTATIN CALCIUM 40 MG/1
40 TABLET, FILM COATED ORAL DAILY
Qty: 90 TABLET | Refills: 3 | Status: SHIPPED | OUTPATIENT
Start: 2025-05-15

## 2025-05-15 RX ORDER — FLUOXETINE 10 MG/1
10 CAPSULE ORAL DAILY
Qty: 90 CAPSULE | Refills: 3 | Status: SHIPPED | OUTPATIENT
Start: 2025-05-15

## 2025-05-15 RX ORDER — AMLODIPINE BESYLATE 10 MG/1
10 TABLET ORAL DAILY
Qty: 90 TABLET | Refills: 3 | Status: SHIPPED | OUTPATIENT
Start: 2025-05-15

## 2025-05-15 NOTE — PROGRESS NOTES
Laureano De La Vega Jr. is a 54 y.o. male who presents today to establish care.    Chief Complaint   Patient presents with    Establish Care     PT has fasted    Cough    Heartburn    Diabetes    Gout        HPI     Patient has been seeing Dr. Alcantar. He decided he wanted a change in provider so he is here to establish care. He has chronic cough and has been tested for asthma which was normal. Chest x-ray was unrevealing. They are thinking the cough is likely related to GERD. He does not have heart burn but does get a sour taste in his mouth at times. He denies nasal congestion and post nasal drip. He has not tried PPI in the past. He has not had voice changes. They have tried to set him up for an EGD in the past but he has not had this performed. He has a hard time finding a pattern to the cough. Sometimes triggered by laughing or when he first starts talking. He also notices it when he lays down at night and first thing in the morning when he gets up. He occasionally will cough up a little sputum which is white in color. He has seen pulmonology in the past who put him on flonase and allegra but did not see any improvement. Pulm recommended GI follow-up which has not been completed.     He follows with Dr. Trammell for thrombocytopenia. He takes amlodipine for HTN. He does not often check his blood pressure at home. He has been taking atorvastatin for HLD. He takes prozac for depression. He tolerates medications well and feels like they work well for him. He has diabetes but has not had been on medication in over a year. He has not been exercising recently. He does try to follow a healthy diet. He has gout and controls this with diet.     Review of Systems   Constitutional:  Negative for fever and unexpected weight loss.   HENT:  Negative for congestion, ear pain and sore throat.    Eyes:  Negative for visual disturbance.   Respiratory:  Negative for cough, shortness of breath and wheezing.    Cardiovascular:  Negative  for chest pain and palpitations.   Gastrointestinal:  Negative for abdominal pain, blood in stool, constipation, diarrhea, nausea, vomiting and GERD.   Endocrine: Negative for polydipsia and polyuria.   Genitourinary:  Negative for difficulty urinating.   Musculoskeletal:  Positive for arthralgias (mild right ankle pain improving). Negative for joint swelling.   Skin:  Negative for rash and skin lesions.   Allergic/Immunologic: Negative for environmental allergies.   Neurological:  Negative for seizures and syncope.   Hematological:  Does not bruise/bleed easily.   Psychiatric/Behavioral:  Negative for suicidal ideas.         PHQ-9 Depression Screening  Little interest or pleasure in doing things? Not at all   Feeling down, depressed, or hopeless? Not at all   PHQ-2 Total Score 0   Trouble falling or staying asleep, or sleeping too much?     Feeling tired or having little energy?     Poor appetite or overeating?     Feeling bad about yourself - or that you are a failure or have let yourself or your family down?     Trouble concentrating on things, such as reading the newspaper or watching television?     Moving or speaking so slowly that other people could have noticed? Or the opposite - being so fidgety or restless that you have been moving around a lot more than usual?     Thoughts that you would be better off dead, or of hurting yourself in some way?     PHQ-9 Total Score     If you checked off any problems, how difficult have these problems made it for you to do your work, take care of things at home, or get along with other people? Not difficult at all          Past Medical History:   Diagnosis Date    Arthritis 1990    gout    Depression     Diabetes mellitus     previously diagnosed with pre-diabetes    GERD (gastroesophageal reflux disease)     Gout     Hyperlipidemia     Hypertension     Primary central sleep apnea     uncertain about this    Sleep apnea, obstructive     uncertain about this        History  "reviewed. No pertinent surgical history.     Family History   Problem Relation Age of Onset    Alcohol abuse Mother     Arthritis Father     Hypertension Father     Diabetes Father     Hyperlipidemia Father     COPD Father     Sleep apnea Father     Heart disease Paternal Grandfather         chf    No Known Problems Sister     Lupus Maternal Grandmother     Other (Other) Maternal Grandfather         unknown    Other (Other) Paternal Grandmother         unknown    Allergies Daughter     No Known Problems Daughter     Cancer Neg Hx     Stroke Neg Hx         Social History     Socioeconomic History    Marital status:    Tobacco Use    Smoking status: Never    Smokeless tobacco: Never   Vaping Use    Vaping status: Never Used   Substance and Sexual Activity    Alcohol use: Not Currently    Drug use: Never    Sexual activity: Not Currently     Partners: Female     Birth control/protection: None        No current outpatient medications on file prior to visit.     No current facility-administered medications on file prior to visit.       No Known Allergies     Visit Vitals  /70   Pulse 76   Temp 98.4 °F (36.9 °C) (Infrared)   Ht 195.6 cm (77.01\")   Wt 110 kg (243 lb)   SpO2 99%   BMI 28.81 kg/m²      Body mass index is 28.81 kg/m².    Physical Exam  Constitutional:       General: He is not in acute distress.     Appearance: He is well-developed. He is not diaphoretic.   HENT:      Head: Atraumatic.   Cardiovascular:      Rate and Rhythm: Normal rate and regular rhythm.      Heart sounds: Normal heart sounds. No murmur heard.     No friction rub. No gallop.   Pulmonary:      Effort: Pulmonary effort is normal. No respiratory distress.      Breath sounds: Normal breath sounds. No stridor. No wheezing, rhonchi or rales.   Abdominal:      General: Bowel sounds are normal. There is no distension.      Palpations: Abdomen is soft. There is no mass.      Tenderness: There is no abdominal tenderness. There is no " guarding or rebound.      Hernia: No hernia is present.   Musculoskeletal:      Cervical back: Normal range of motion and neck supple.   Skin:     General: Skin is warm and dry.   Neurological:      Mental Status: He is alert and oriented to person, place, and time.   Psychiatric:         Behavior: Behavior normal.          Results for orders placed or performed in visit on 01/23/24   Comprehensive Metabolic Panel    Collection Time: 01/23/24 11:47 AM    Specimen: Blood   Result Value Ref Range    Glucose 133 (H) 65 - 99 mg/dL    BUN 13 6 - 20 mg/dL    Creatinine 1.09 0.76 - 1.27 mg/dL    Sodium 142 136 - 145 mmol/L    Potassium 4.2 3.5 - 5.2 mmol/L    Chloride 103 98 - 107 mmol/L    CO2 30.0 (H) 22.0 - 29.0 mmol/L    Calcium 9.4 8.6 - 10.5 mg/dL    Total Protein 7.3 6.0 - 8.5 g/dL    Albumin 4.9 3.5 - 5.2 g/dL    ALT (SGPT) 29 1 - 41 U/L    AST (SGOT) 20 1 - 40 U/L    Alkaline Phosphatase 97 39 - 117 U/L    Total Bilirubin 0.5 0.0 - 1.2 mg/dL    Globulin 2.4 gm/dL    A/G Ratio 2.0 g/dL    BUN/Creatinine Ratio 11.9 7.0 - 25.0    Anion Gap 9.0 5.0 - 15.0 mmol/L    eGFR 81.7 >60.0 mL/min/1.73   Ferritin    Collection Time: 01/23/24 11:47 AM    Specimen: Blood   Result Value Ref Range    Ferritin 454.90 (H) 30.00 - 400.00 ng/mL   Folate    Collection Time: 01/23/24 11:47 AM    Specimen: Blood   Result Value Ref Range    Folate 7.89 4.78 - 24.20 ng/mL   Iron Profile    Collection Time: 01/23/24 11:47 AM    Specimen: Blood   Result Value Ref Range    Iron 81 59 - 158 mcg/dL    Iron Saturation (TSAT) 19 (L) 20 - 50 %    Transferrin 283 200 - 360 mg/dL    TIBC 422 298 - 536 mcg/dL   Lactate Dehydrogenase    Collection Time: 01/23/24 11:47 AM    Specimen: Blood   Result Value Ref Range     (H) 135 - 225 U/L   Haptoglobin    Collection Time: 01/23/24 11:47 AM    Specimen: Blood   Result Value Ref Range    Haptoglobin 157 30 - 200 mg/dL   Vitamin B12    Collection Time: 01/23/24 11:47 AM    Specimen: Blood   Result  Value Ref Range    Vitamin B-12 422 211 - 946 pg/mL   HIV-1 / O / 2 Ag / Antibody    Collection Time: 01/23/24 11:47 AM    Specimen: Blood   Result Value Ref Range    HIV-1/ HIV-2 Non-Reactive Non-Reactive   CBC Auto Differential    Collection Time: 01/23/24 11:47 AM    Specimen: Blood   Result Value Ref Range    WBC 6.98 3.40 - 10.80 10*3/mm3    RBC 5.43 4.14 - 5.80 10*6/mm3    Hemoglobin 15.8 13.0 - 17.7 g/dL    Hematocrit 46.1 37.5 - 51.0 %    MCV 84.9 79.0 - 97.0 fL    MCH 29.1 26.6 - 33.0 pg    MCHC 34.3 31.5 - 35.7 g/dL    RDW 13.1 12.3 - 15.4 %    RDW-SD 40.9 37.0 - 54.0 fl    MPV 12.5 (H) 6.0 - 12.0 fL    Platelets 88 (L) 140 - 450 10*3/mm3    Neutrophil % 53.1 42.7 - 76.0 %    Lymphocyte % 34.2 19.6 - 45.3 %    Monocyte % 9.0 5.0 - 12.0 %    Eosinophil % 2.9 0.3 - 6.2 %    Basophil % 0.7 0.0 - 1.5 %    Immature Grans % 0.1 0.0 - 0.5 %    Neutrophils, Absolute 3.70 1.70 - 7.00 10*3/mm3    Lymphocytes, Absolute 2.39 0.70 - 3.10 10*3/mm3    Monocytes, Absolute 0.63 0.10 - 0.90 10*3/mm3    Eosinophils, Absolute 0.20 0.00 - 0.40 10*3/mm3    Basophils, Absolute 0.05 0.00 - 0.20 10*3/mm3    Immature Grans, Absolute 0.01 0.00 - 0.05 10*3/mm3   Peripheral Blood Smear    Collection Time: 01/23/24 11:47 AM    Specimen: Blood   Result Value Ref Range    Performed by: Shantanu Vargas MD     Pathologist Interpretation       Normal total white blood cell count with differential compatible with the automated differential reported.  No abnormal/immature white blood cells noted.  Normochromic normocytic red blood cells without anemia.  Thrombocytopenia with normal platelet appearance.  No significant platelet clumping identified.        Problems Addressed this Visit          Cardiac and Vasculature    Primary hypertension - Primary    Hypertension is stable and controlled  Continue current treatment regimen.  Blood pressure will be reassessed in 3 months.         Relevant Medications    amLODIPine (NORVASC) 10 MG tablet     Other Relevant Orders    Comprehensive Metabolic Panel (Completed)    CBC & Differential (Completed)    Lipid Panel (Completed)    Hemoglobin A1c (Completed)    Microalbumin / Creatinine Urine Ratio - Urine, Clean Catch    Mixed hyperlipidemia     Lipid abnormalities are worsening    Plan:  Continue same medication/s without change.   and we will recheck lipid panel today and make adjustment to atorvastatin if LDL cholesterol remains above 70.      Discussed medication dosage, use, side effects, and goals of treatment in detail.    Counseled patient on lifestyle modifications to help control hyperlipidemia.     Patient Treatment Goals:   LDL goal is less than 70    Followup in 3 months.         Relevant Medications    atorvastatin (LIPITOR) 40 MG tablet    Other Relevant Orders    Comprehensive Metabolic Panel (Completed)    Lipid Panel (Completed)       Endocrine and Metabolic    Type 2 diabetes mellitus without complication, without long-term current use of insulin    Diabetes is stable.   Recommended an ADA diet.  Regular aerobic exercise.  Diabetes will be reassessed in 3 months         Relevant Orders    Comprehensive Metabolic Panel (Completed)    CBC & Differential (Completed)    Lipid Panel (Completed)    Hemoglobin A1c (Completed)    Microalbumin / Creatinine Urine Ratio - Urine, Clean Catch       Gastrointestinal Abdominal     GERD (gastroesophageal reflux disease)    Chronic which is suspected to be secondary to poorly controlled GERD.  Patient is hesitant to start proton pump inhibitor.  Will start patient on famotidine 20 mg twice daily.  Patient was given referral for EGD for further evaluation.         Relevant Medications    famotidine (Pepcid) 20 MG tablet    Other Relevant Orders    Ambulatory referral for Screening EGD    Colon cancer screening    Relevant Orders    Ambulatory Referral For Screening Colonoscopy       Mental Health    Major depressive disorder with single episode, in partial  remission    Patient's depression is a single episode that is mild without psychosis. Depression is in full remission and stable.    Plan:   Continue current medication therapy     Followup in 3 months.          Relevant Medications    FLUoxetine (PROzac) 10 MG capsule       Musculoskeletal and Injuries    Gout    Relevant Orders    Uric Acid (Completed)       Pulmonary and Pneumonias    Chronic cough    Relevant Medications    famotidine (Pepcid) 20 MG tablet    Other Relevant Orders    Ambulatory referral for Screening EGD     Diagnoses         Codes Comments      Primary hypertension    -  Primary ICD-10-CM: I10  ICD-9-CM: 401.9       Mixed hyperlipidemia     ICD-10-CM: E78.2  ICD-9-CM: 272.2       Type 2 diabetes mellitus without complication, without long-term current use of insulin     ICD-10-CM: E11.9  ICD-9-CM: 250.00       Major depressive disorder with single episode, in partial remission     ICD-10-CM: F32.4  ICD-9-CM: 296.25       Chronic cough     ICD-10-CM: R05.3  ICD-9-CM: 786.2       Acute gout of multiple sites, unspecified cause     ICD-10-CM: M10.9  ICD-9-CM: 274.01       Gastroesophageal reflux disease, unspecified whether esophagitis present     ICD-10-CM: K21.9  ICD-9-CM: 530.81       Colon cancer screening     ICD-10-CM: Z12.11  ICD-9-CM: V76.51             Return in about 3 months (around 8/15/2025) for Follow-up GERD, cough, HTN, HLD, DM.    Bartolo Torres MD  5/16/2025

## 2025-05-15 NOTE — PATIENT INSTRUCTIONS
"Hypertension, Adult  High blood pressure (hypertension) is when the force of blood pumping through the arteries is too strong. The arteries are the blood vessels that carry blood from the heart throughout the body. Hypertension forces the heart to work harder to pump blood and may cause arteries to become narrow or stiff. Untreated or uncontrolled hypertension can lead to a heart attack, heart failure, a stroke, kidney disease, and other problems.  A blood pressure reading consists of a higher number over a lower number. Ideally, your blood pressure should be below 120/80. The first (\"top\") number is called the systolic pressure. It is a measure of the pressure in your arteries as your heart beats. The second (\"bottom\") number is called the diastolic pressure. It is a measure of the pressure in your arteries as the heart relaxes.  What are the causes?  The exact cause of this condition is not known. There are some conditions that result in high blood pressure.  What increases the risk?  Certain factors may make you more likely to develop high blood pressure. Some of these risk factors are under your control, including:  Smoking.  Not getting enough exercise or physical activity.  Being overweight.  Having too much fat, sugar, calories, or salt (sodium) in your diet.  Drinking too much alcohol.  Other risk factors include:  Having a personal history of heart disease, diabetes, high cholesterol, or kidney disease.  Stress.  Having a family history of high blood pressure and high cholesterol.  Having obstructive sleep apnea.  Age. The risk increases with age.  What are the signs or symptoms?  High blood pressure may not cause symptoms. Very high blood pressure (hypertensive crisis) may cause:  Headache.  Fast or irregular heartbeats (palpitations).  Shortness of breath.  Nosebleed.  Nausea and vomiting.  Vision changes.  Severe chest pain, dizziness, and seizures.  How is this diagnosed?  This condition is diagnosed by " measuring your blood pressure while you are seated, with your arm resting on a flat surface, your legs uncrossed, and your feet flat on the floor. The cuff of the blood pressure monitor will be placed directly against the skin of your upper arm at the level of your heart. Blood pressure should be measured at least twice using the same arm. Certain conditions can cause a difference in blood pressure between your right and left arms.  If you have a high blood pressure reading during one visit or you have normal blood pressure with other risk factors, you may be asked to:  Return on a different day to have your blood pressure checked again.  Monitor your blood pressure at home for 1 week or longer.  If you are diagnosed with hypertension, you may have other blood or imaging tests to help your health care provider understand your overall risk for other conditions.  How is this treated?  This condition is treated by making healthy lifestyle changes, such as eating healthy foods, exercising more, and reducing your alcohol intake. You may be referred for counseling on a healthy diet and physical activity.  Your health care provider may prescribe medicine if lifestyle changes are not enough to get your blood pressure under control and if:  Your systolic blood pressure is above 130.  Your diastolic blood pressure is above 80.  Your personal target blood pressure may vary depending on your medical conditions, your age, and other factors.  Follow these instructions at home:  Eating and drinking    Eat a diet that is high in fiber and potassium, and low in sodium, added sugar, and fat. An example of this eating plan is called the DASH diet. DASH stands for Dietary Approaches to Stop Hypertension. To eat this way:  Eat plenty of fresh fruits and vegetables. Try to fill one half of your plate at each meal with fruits and vegetables.  Eat whole grains, such as whole-wheat pasta, brown rice, or whole-grain bread. Fill about one  fourth of your plate with whole grains.  Eat or drink low-fat dairy products, such as skim milk or low-fat yogurt.  Avoid fatty cuts of meat, processed or cured meats, and poultry with skin. Fill about one fourth of your plate with lean proteins, such as fish, chicken without skin, beans, eggs, or tofu.  Avoid pre-made and processed foods. These tend to be higher in sodium, added sugar, and fat.  Reduce your daily sodium intake. Many people with hypertension should eat less than 1,500 mg of sodium a day.  Do not drink alcohol if:  Your health care provider tells you not to drink.  You are pregnant, may be pregnant, or are planning to become pregnant.  If you drink alcohol:  Limit how much you have to:  0-1 drink a day for women.  0-2 drinks a day for men.  Know how much alcohol is in your drink. In the [place] one drink equals one 12 oz bottle of beer (355 mL), one 5 oz glass of wine (148 mL), or one 1½ oz glass of hard liquor (44 mL).  Lifestyle    Work with your health care provider to maintain a healthy body weight or to lose weight. Ask what an ideal weight is for you.  Get at least 30 minutes of exercise that causes your heart to beat faster (aerobic exercise) most days of the week. Activities may include walking, swimming, or biking.  Include exercise to strengthen your muscles (resistance exercise), such as Pilates or lifting weights, as part of your weekly exercise routine. Try to do these types of exercises for 30 minutes at least 3 days a week.  Do not use any products that contain nicotine or tobacco. These products include cigarettes, chewing tobacco, and vaping devices, such as e-cigarettes. If you need help quitting, ask your health care provider.  Monitor your blood pressure at home as told by your health care provider.  Keep all follow-up visits. This is important.  Medicines  Take over-the-counter and prescription medicines only as told by your health care provider. Follow directions carefully. Blood  pressure medicines must be taken as prescribed.  Do not skip doses of blood pressure medicine. Doing this puts you at risk for problems and can make the medicine less effective.  Ask your health care provider about side effects or reactions to medicines that you should watch for.  Contact a health care provider if you:  Think you are having a reaction to a medicine you are taking.  Have headaches that keep coming back (recurring).  Feel dizzy.  Have swelling in your ankles.  Have trouble with your vision.  Get help right away if you:  Develop a severe headache or confusion.  Have unusual weakness or numbness.  Feel faint.  Have severe pain in your chest or abdomen.  Vomit repeatedly.  Have trouble breathing.  These symptoms may be an emergency. Get help right away. Call 911.  Do not wait to see if the symptoms will go away.  Do not drive yourself to the hospital.  Summary  Hypertension is when the force of blood pumping through your arteries is too strong. If this condition is not controlled, it may put you at risk for serious complications.  Your personal target blood pressure may vary depending on your medical conditions, your age, and other factors. For most people, a normal blood pressure is less than 120/80.  Hypertension is treated with lifestyle changes, medicines, or a combination of both. Lifestyle changes include losing weight, eating a healthy, low-sodium diet, exercising more, and limiting alcohol.  This information is not intended to replace advice given to you by your health care provider. Make sure you discuss any questions you have with your health care provider.  Document Revised: 10/25/2022 Document Reviewed: 10/25/2022  Elsevier Patient Education © 2024 Elsevier Inc.Diabetes Mellitus and Nutrition, Adult  When you have diabetes, or diabetes mellitus, it is very important to have healthy eating habits because your blood sugar (glucose) levels are greatly affected by what you eat and drink. Eating  healthy foods in the right amounts, at about the same times every day, can help you:  Manage your blood glucose.  Lower your risk of heart disease.  Improve your blood pressure.  Reach or maintain a healthy weight.  What can affect my meal plan?  Every person with diabetes is different, and each person has different needs for a meal plan. Your health care provider may recommend that you work with a dietitian to make a meal plan that is best for you. Your meal plan may vary depending on factors such as:  The calories you need.  The medicines you take.  Your weight.  Your blood glucose, blood pressure, and cholesterol levels.  Your activity level.  Other health conditions you have, such as heart or kidney disease.  How do carbohydrates affect me?  Carbohydrates, also called carbs, affect your blood glucose level more than any other type of food. Eating carbs raises the amount of glucose in your blood.  It is important to know how many carbs you can safely have in each meal. This is different for every person. Your dietitian can help you calculate how many carbs you should have at each meal and for each snack.  How does alcohol affect me?  Alcohol can cause a decrease in blood glucose (hypoglycemia), especially if you use insulin or take certain diabetes medicines by mouth. Hypoglycemia can be a life-threatening condition. Symptoms of hypoglycemia, such as sleepiness, dizziness, and confusion, are similar to symptoms of having too much alcohol.  Do not drink alcohol if:  Your health care provider tells you not to drink.  You are pregnant, may be pregnant, or are planning to become pregnant.  If you drink alcohol:  Limit how much you have to:  0-1 drink a day for women.  0-2 drinks a day for men.  Know how much alcohol is in your drink. In the [place] one drink equals one 12 oz bottle of beer (355 mL), one 5 oz glass of wine (148 mL), or one 1½ oz glass of hard liquor (44 mL).  Keep yourself hydrated with water, diet  "soda, or unsweetened iced tea. Keep in mind that regular soda, juice, and other mixers may contain a lot of sugar and must be counted as carbs.  What are tips for following this plan?    Reading food labels  Start by checking the serving size on the Nutrition Facts label of packaged foods and drinks. The number of calories and the amount of carbs, fats, and other nutrients listed on the label are based on one serving of the item. Many items contain more than one serving per package.  Check the total grams (g) of carbs in one serving.  Check the number of grams of saturated fats and trans fats in one serving. Choose foods that have a low amount or none of these fats.  Check the number of milligrams (mg) of salt (sodium) in one serving. Most people should limit total sodium intake to less than 2,300 mg per day.  Always check the nutrition information of foods labeled as \"low-fat\" or \"nonfat.\" These foods may be higher in added sugar or refined carbs and should be avoided.  Talk to your dietitian to identify your daily goals for nutrients listed on the label.  Shopping  Avoid buying canned, pre-made, or processed foods. These foods tend to be high in fat, sodium, and added sugar.  Shop around the outside edge of the grocery store. This is where you will most often find fresh fruits and vegetables, bulk grains, fresh meats, and fresh dairy products.  Cooking  Use low-heat cooking methods, such as baking, instead of high-heat cooking methods, such as deep frying.  Cook using healthy oils, such as olive, canola, or sunflower oil.  Avoid cooking with butter, cream, or high-fat meats.  Meal planning  Eat meals and snacks regularly, preferably at the same times every day. Avoid going long periods of time without eating.  Eat foods that are high in fiber, such as fresh fruits, vegetables, beans, and whole grains.  Eat 4-6 oz (112-168 g) of lean protein each day, such as lean meat, chicken, fish, eggs, or tofu. One ounce (oz) " (28 g) of lean protein is equal to:  1 oz (28 g) of meat, chicken, or fish.  1 egg.  ¼ cup (62 g) of tofu.  Eat some foods each day that contain healthy fats, such as avocado, nuts, seeds, and fish.  What foods should I eat?  Fruits  Berries. Apples. Oranges. Peaches. Apricots. Plums. Grapes. Mangoes. Papayas. Pomegranates. Kiwi. Cherries.  Vegetables  Leafy greens, including lettuce, spinach, kale, chard, ronny greens, mustard greens, and cabbage. Beets. Cauliflower. Broccoli. Carrots. Green beans. Tomatoes. Peppers. Onions. Cucumbers. [place] sprouts.  Grains  Whole grains, such as whole-wheat or whole-grain bread, crackers, tortillas, cereal, and pasta. Unsweetened oatmeal. Quinoa. Brown or wild rice.  Meats and other proteins  Seafood. Poultry without skin. Lean cuts of poultry and beef. Tofu. Nuts. Seeds.  Dairy  Low-fat or fat-free dairy products such as milk, yogurt, and cheese.  The items listed above may not be a complete list of foods and beverages you can eat and drink. Contact a dietitian for more information.  What foods should I avoid?  Fruits  Fruits canned with syrup.  Vegetables  Canned vegetables. Frozen vegetables with butter or cream sauce.  Grains  Refined white flour and flour products such as bread, pasta, snack foods, and cereals. Avoid all processed foods.  Meats and other proteins  Fatty cuts of meat. Poultry with skin. Breaded or fried meats. Processed meat. Avoid saturated fats.  Dairy  Full-fat yogurt, cheese, or milk.  Beverages  Sweetened drinks, such as soda or iced tea.  The items listed above may not be a complete list of foods and beverages you should avoid. Contact a dietitian for more information.  Questions to ask a health care provider  Do I need to meet with a certified diabetes care and ?  Do I need to meet with a dietitian?  What number can I call if I have questions?  When are the best times to check my blood glucose?  Where to find more  information:  American Diabetes Association: diabetes.org  Academy of Nutrition and Dietetics: eatright.org  National [place] of Diabetes and Digestive and Kidney Diseases: niddk.nih.gov  Association of Diabetes Care & Education Specialists: diabeteseducator.org  Summary  It is important to have healthy eating habits because your blood sugar (glucose) levels are greatly affected by what you eat and drink. It is important to use alcohol carefully.  A healthy meal plan will help you manage your blood glucose and lower your risk of heart disease.  Your health care provider may recommend that you work with a dietitian to make a meal plan that is best for you.  This information is not intended to replace advice given to you by your health care provider. Make sure you discuss any questions you have with your health care provider.  Document Revised: 07/20/2021 Document Reviewed: 07/21/2021  Elsevier Patient Education © 2024 Elsevier Inc.

## 2025-05-16 NOTE — ASSESSMENT & PLAN NOTE
Patient's depression is a single episode that is mild without psychosis. Depression is in full remission and stable.    Plan:   Continue current medication therapy     Followup in 3 months.

## 2025-05-16 NOTE — ASSESSMENT & PLAN NOTE
Diabetes is stable.   Recommended an ADA diet.  Regular aerobic exercise.  Diabetes will be reassessed in 3 months

## 2025-05-16 NOTE — ASSESSMENT & PLAN NOTE
Lipid abnormalities are worsening    Plan:  Continue same medication/s without change.   and we will recheck lipid panel today and make adjustment to atorvastatin if LDL cholesterol remains above 70.      Discussed medication dosage, use, side effects, and goals of treatment in detail.    Counseled patient on lifestyle modifications to help control hyperlipidemia.     Patient Treatment Goals:   LDL goal is less than 70    Followup in 3 months.

## 2025-05-16 NOTE — ASSESSMENT & PLAN NOTE
Chronic which is suspected to be secondary to poorly controlled GERD.  Patient is hesitant to start proton pump inhibitor.  Will start patient on famotidine 20 mg twice daily.  Patient was given referral for EGD for further evaluation.

## 2025-05-19 DIAGNOSIS — E11.9 TYPE 2 DIABETES MELLITUS WITHOUT COMPLICATION, WITHOUT LONG-TERM CURRENT USE OF INSULIN: Primary | ICD-10-CM

## 2025-05-19 RX ORDER — DAPAGLIFLOZIN 5 MG/1
5 TABLET, FILM COATED ORAL DAILY
Qty: 30 TABLET | Refills: 0 | Status: SHIPPED | OUTPATIENT
Start: 2025-05-19

## 2025-05-19 RX ORDER — DAPAGLIFLOZIN 10 MG/1
10 TABLET, FILM COATED ORAL DAILY
Qty: 30 TABLET | Refills: 2 | Status: SHIPPED | OUTPATIENT
Start: 2025-05-19

## 2025-05-20 DIAGNOSIS — E11.9 TYPE 2 DIABETES MELLITUS WITHOUT COMPLICATION, WITHOUT LONG-TERM CURRENT USE OF INSULIN: Primary | ICD-10-CM

## 2025-05-20 RX ORDER — METFORMIN HYDROCHLORIDE 500 MG/1
TABLET, EXTENDED RELEASE ORAL
Qty: 180 TABLET | Refills: 3 | Status: SHIPPED | OUTPATIENT
Start: 2025-05-20

## 2025-07-09 ENCOUNTER — OUTSIDE FACILITY SERVICE (OUTPATIENT)
Dept: GASTROENTEROLOGY | Facility: CLINIC | Age: 55
End: 2025-07-09
Payer: COMMERCIAL

## 2025-07-09 PROCEDURE — 43239 EGD BIOPSY SINGLE/MULTIPLE: CPT | Performed by: INTERNAL MEDICINE

## 2025-07-09 PROCEDURE — 45385 COLONOSCOPY W/LESION REMOVAL: CPT | Performed by: INTERNAL MEDICINE

## 2025-07-09 PROCEDURE — 88305 TISSUE EXAM BY PATHOLOGIST: CPT | Performed by: INTERNAL MEDICINE

## 2025-07-09 RX ORDER — PANTOPRAZOLE SODIUM 40 MG/1
40 TABLET, DELAYED RELEASE ORAL 2 TIMES DAILY
Qty: 180 TABLET | Refills: 3 | Status: SHIPPED | OUTPATIENT
Start: 2025-07-09

## 2025-07-10 ENCOUNTER — LAB REQUISITION (OUTPATIENT)
Dept: LAB | Facility: HOSPITAL | Age: 55
End: 2025-07-10
Payer: COMMERCIAL

## 2025-07-10 DIAGNOSIS — K44.9 DIAPHRAGMATIC HERNIA WITHOUT OBSTRUCTION OR GANGRENE: ICD-10-CM

## 2025-07-10 DIAGNOSIS — Z12.11 ENCOUNTER FOR SCREENING FOR MALIGNANT NEOPLASM OF COLON: ICD-10-CM

## 2025-07-10 DIAGNOSIS — D12.8 BENIGN NEOPLASM OF RECTUM: ICD-10-CM

## 2025-07-10 DIAGNOSIS — Q39.9 CONGENITAL MALFORMATION OF ESOPHAGUS, UNSPECIFIED: ICD-10-CM

## 2025-07-10 DIAGNOSIS — R05.3 CHRONIC COUGH: ICD-10-CM

## 2025-07-10 DIAGNOSIS — K31.89 OTHER DISEASES OF STOMACH AND DUODENUM: ICD-10-CM

## 2025-07-10 DIAGNOSIS — K57.30 DIVERTICULOSIS OF LARGE INTESTINE WITHOUT PERFORATION OR ABSCESS WITHOUT BLEEDING: ICD-10-CM

## 2025-07-10 DIAGNOSIS — K64.8 OTHER HEMORRHOIDS: ICD-10-CM

## 2025-07-10 DIAGNOSIS — K21.9 GASTRO-ESOPHAGEAL REFLUX DISEASE WITHOUT ESOPHAGITIS: ICD-10-CM

## 2025-07-11 ENCOUNTER — RESULTS FOLLOW-UP (OUTPATIENT)
Dept: LAB | Facility: HOSPITAL | Age: 55
End: 2025-07-11
Payer: COMMERCIAL

## 2025-07-11 LAB
CYTO UR: NORMAL
LAB AP CASE REPORT: NORMAL
LAB AP CLINICAL INFORMATION: NORMAL
PATH REPORT.FINAL DX SPEC: NORMAL
PATH REPORT.GROSS SPEC: NORMAL

## 2025-07-11 NOTE — LETTER
Laureano De La Vega Jr.  [address] [place] [id number]    July 11, 2025     Dear  De La Vega:    Below are the results from your recent visit:    Resulted Orders   Tissue Pathology Exam   Result Value Ref Range    Case Report       Surgical Pathology Report                         Case: [id number]                                  Authorizing Provider:  Esteban Boothe MD    Collected:           07/09/2025 01:42 PM          Ordering Location:     [facility]   Received:            07/10/2025 09:02 AM                                 LABORATORY                                                                   Pathologist:           Lucas Shepard MD                                                             Specimens:   1) - Per Rectum                                                                                     2) - Stomach                                                                                        3) - Esophagus                                                                             Clinical Information       Congenital malformation of esophagus, unspecified  Diaphragmatic hernia without obstruction or gangrene  Other diseases of stomach and duodenum  Encounter for screening for malignant neoplasm of colon  Benign neoplasm of rectum  Other hemorrhoids  Diverticulosis of large intestine without perforation or abscess without bleeding  Chronic cough  Gastro-esophageal reflux disease without esophagitis      Final Diagnosis       COLON, RECTUM, POLYP:  Hyperplastic polyp  2.   STOMACH, BIOPSY:  Gastric mucosa with mild chronic inactive gastritis  Negative for intestinal metaplasia or dysplasia  No Helicobacter pylori like organisms seen  3.   ESOPHAGUS, BIOPSY:  Squamous mucosa with features suggestive of reflux esophagitis (No eosinophils seen)  Negative for glandular type mucosa, intestinal metaplasia, or dysplasia  Negative for specific microorganisms        Gross  "Description       1. Per Rectum.  Received in formalin labeled \"rectum polyp\" is a 0.2 x 0.2 x 0.1 cm single tan tissue fragment, submitted entirely in a single cassette.  LDP    2. Stomach.  Received in formalin labeled \"stomach\" is a 1 x 0.2 x 0.1 cm aggregate of multiple tan tissue fragments, submitted entirely in a single cassette.  LDP    3. Esophagus.  Received in formalin labeled \"esophagus\" is a 1 x 0.1 x 0.1 cm aggregate of multiple white tissue fragments, submitted entirely in a single cassette.  LDP        Microscopic Description       The slides are reviewed and demonstrate histopathologic features supporting the above rendered diagnosis.           Colon:  Your tissue is benign without precancerous tissue.  I recommend a repeat colonoscopy in 5 years as suggested by your colonoscopy report .       EGD:  Hello,  Your EGD biopsies do not show evidence of a bacterial infection or food allergy condition.  This is good news.  If you continue to have problematic symptoms, please call the GI clinic to schedule an appointment.  It was nice to see you!  Thank you,     Dr. Boothe     Tissue Pathology Exam    If you have any questions or concerns, please don't hesitate to call.         Sincerely,        Esteban Boothe MD      "

## 2025-07-15 ENCOUNTER — PRIOR AUTHORIZATION (OUTPATIENT)
Dept: GASTROENTEROLOGY | Facility: CLINIC | Age: 55
End: 2025-07-15
Payer: COMMERCIAL

## 2025-07-15 NOTE — TELEPHONE ENCOUNTER
Laureano De La Vega (Key: [id number])  [place] Case ID #: [id number]  Rx #: [id number]  Need Help? Call us at [phone]  Status  sent iconSent to Plan today  Drug  Pantoprazole Sodium 40MG dr tablets  ePA cloud logo  Form  [facility] Electronic Prior Authorization Form (2017 NCPDP)

## 2025-07-21 DIAGNOSIS — E11.9 TYPE 2 DIABETES MELLITUS WITHOUT COMPLICATION, WITHOUT LONG-TERM CURRENT USE OF INSULIN: ICD-10-CM

## 2025-07-21 RX ORDER — METFORMIN HYDROCHLORIDE 500 MG/1
TABLET, EXTENDED RELEASE ORAL
Qty: 180 TABLET | Refills: 3 | Status: SHIPPED | OUTPATIENT
Start: 2025-07-21 | End: 2025-07-24 | Stop reason: SDUPTHER

## 2025-07-23 ENCOUNTER — TELEPHONE (OUTPATIENT)
Dept: FAMILY MEDICINE CLINIC | Facility: CLINIC | Age: 55
End: 2025-07-23
Payer: COMMERCIAL

## 2025-07-23 DIAGNOSIS — E11.9 TYPE 2 DIABETES MELLITUS WITHOUT COMPLICATION, WITHOUT LONG-TERM CURRENT USE OF INSULIN: ICD-10-CM

## 2025-07-23 NOTE — TELEPHONE ENCOUNTER
Chart shows a refill of Metformin was sent to [facility] yesterday, but patient is requesting it be sent to [facility] home delivery.

## 2025-07-24 RX ORDER — METFORMIN HYDROCHLORIDE 500 MG/1
500 TABLET, EXTENDED RELEASE ORAL 2 TIMES DAILY
Qty: 180 TABLET | Refills: 0 | Status: SHIPPED | OUTPATIENT
Start: 2025-07-24

## 2025-08-19 ENCOUNTER — LAB (OUTPATIENT)
Dept: LAB | Facility: HOSPITAL | Age: 55
End: 2025-08-19
Payer: COMMERCIAL

## 2025-08-19 ENCOUNTER — OFFICE VISIT (OUTPATIENT)
Dept: FAMILY MEDICINE CLINIC | Facility: CLINIC | Age: 55
End: 2025-08-19
Payer: COMMERCIAL

## 2025-08-19 VITALS
WEIGHT: 230.4 LBS | HEART RATE: 83 BPM | OXYGEN SATURATION: 97 % | SYSTOLIC BLOOD PRESSURE: 114 MMHG | BODY MASS INDEX: 28.65 KG/M2 | TEMPERATURE: 98.6 F | DIASTOLIC BLOOD PRESSURE: 70 MMHG | HEIGHT: 75 IN

## 2025-08-19 DIAGNOSIS — K21.00 GASTROESOPHAGEAL REFLUX DISEASE WITH ESOPHAGITIS WITHOUT HEMORRHAGE: Primary | ICD-10-CM

## 2025-08-19 DIAGNOSIS — E78.2 MIXED HYPERLIPIDEMIA: ICD-10-CM

## 2025-08-19 DIAGNOSIS — M10.9 ACUTE GOUT OF MULTIPLE SITES, UNSPECIFIED CAUSE: ICD-10-CM

## 2025-08-19 DIAGNOSIS — K21.9 GASTROESOPHAGEAL REFLUX DISEASE, UNSPECIFIED WHETHER ESOPHAGITIS PRESENT: ICD-10-CM

## 2025-08-19 DIAGNOSIS — I10 PRIMARY HYPERTENSION: ICD-10-CM

## 2025-08-19 DIAGNOSIS — R05.3 CHRONIC COUGH: ICD-10-CM

## 2025-08-19 DIAGNOSIS — E11.9 TYPE 2 DIABETES MELLITUS WITHOUT COMPLICATION, WITHOUT LONG-TERM CURRENT USE OF INSULIN: ICD-10-CM

## 2025-08-19 LAB
ALBUMIN SERPL-MCNC: 4.5 G/DL (ref 3.5–5.2)
ALBUMIN/GLOB SERPL: 1.6 G/DL
ALP SERPL-CCNC: 76 U/L (ref 39–117)
ALT SERPL W P-5'-P-CCNC: 22 U/L (ref 1–41)
ANION GAP SERPL CALCULATED.3IONS-SCNC: 13 MMOL/L (ref 5–15)
AST SERPL-CCNC: 20 U/L (ref 1–40)
BASOPHILS # BLD AUTO: 0.03 10*3/MM3 (ref 0–0.2)
BASOPHILS NFR BLD AUTO: 0.3 % (ref 0–1.5)
BILIRUB SERPL-MCNC: 0.7 MG/DL (ref 0–1.2)
BUN SERPL-MCNC: 13 MG/DL (ref 6–20)
BUN/CREAT SERPL: 10.8 (ref 7–25)
CALCIUM SPEC-SCNC: 9.6 MG/DL (ref 8.6–10.5)
CHLORIDE SERPL-SCNC: 106 MMOL/L (ref 98–107)
CHOLEST SERPL-MCNC: 178 MG/DL (ref 0–200)
CO2 SERPL-SCNC: 24 MMOL/L (ref 22–29)
CREAT SERPL-MCNC: 1.2 MG/DL (ref 0.76–1.27)
DEPRECATED RDW RBC AUTO: 39.2 FL (ref 37–54)
EGFRCR SERPLBLD CKD-EPI 2021: 72.3 ML/MIN/1.73
EOSINOPHIL # BLD AUTO: 0.17 10*3/MM3 (ref 0–0.4)
EOSINOPHIL NFR BLD AUTO: 1.5 % (ref 0.3–6.2)
ERYTHROCYTE [DISTWIDTH] IN BLOOD BY AUTOMATED COUNT: 12.9 % (ref 12.3–15.4)
GLOBULIN UR ELPH-MCNC: 2.8 GM/DL
GLUCOSE SERPL-MCNC: 131 MG/DL (ref 65–99)
HBA1C MFR BLD: 6.5 % (ref 4.8–5.6)
HCT VFR BLD AUTO: 45.5 % (ref 37.5–51)
HDLC SERPL-MCNC: 32 MG/DL (ref 40–60)
HGB BLD-MCNC: 15.7 G/DL (ref 13–17.7)
IMM GRANULOCYTES # BLD AUTO: 0.03 10*3/MM3 (ref 0–0.05)
IMM GRANULOCYTES NFR BLD AUTO: 0.3 % (ref 0–0.5)
LDLC SERPL CALC-MCNC: 117 MG/DL (ref 0–100)
LDLC/HDLC SERPL: 3.53 {RATIO}
LYMPHOCYTES # BLD AUTO: 2.07 10*3/MM3 (ref 0.7–3.1)
LYMPHOCYTES NFR BLD AUTO: 18.2 % (ref 19.6–45.3)
MCH RBC QN AUTO: 29.2 PG (ref 26.6–33)
MCHC RBC AUTO-ENTMCNC: 34.5 G/DL (ref 31.5–35.7)
MCV RBC AUTO: 84.7 FL (ref 79–97)
MONOCYTES # BLD AUTO: 0.95 10*3/MM3 (ref 0.1–0.9)
MONOCYTES NFR BLD AUTO: 8.4 % (ref 5–12)
NEUTROPHILS NFR BLD AUTO: 71.3 % (ref 42.7–76)
NEUTROPHILS NFR BLD AUTO: 8.1 10*3/MM3 (ref 1.7–7)
PLATELET # BLD AUTO: 93 10*3/MM3 (ref 140–450)
PMV BLD AUTO: 14.1 FL (ref 6–12)
POTASSIUM SERPL-SCNC: 4.1 MMOL/L (ref 3.5–5.2)
PROT SERPL-MCNC: 7.3 G/DL (ref 6–8.5)
RBC # BLD AUTO: 5.37 10*6/MM3 (ref 4.14–5.8)
SODIUM SERPL-SCNC: 143 MMOL/L (ref 136–145)
TRIGL SERPL-MCNC: 165 MG/DL (ref 0–150)
URATE SERPL-MCNC: 8 MG/DL (ref 3.4–7)
VLDLC SERPL-MCNC: 29 MG/DL (ref 5–40)
WBC NRBC COR # BLD AUTO: 11.35 10*3/MM3 (ref 3.4–10.8)

## 2025-08-19 PROCEDURE — 85025 COMPLETE CBC W/AUTO DIFF WBC: CPT

## 2025-08-19 PROCEDURE — 83036 HEMOGLOBIN GLYCOSYLATED A1C: CPT

## 2025-08-19 PROCEDURE — 80061 LIPID PANEL: CPT

## 2025-08-19 PROCEDURE — 84550 ASSAY OF BLOOD/URIC ACID: CPT

## 2025-08-19 PROCEDURE — 80053 COMPREHEN METABOLIC PANEL: CPT

## 2025-08-19 RX ORDER — COLCHICINE 0.6 MG/1
0.6 TABLET ORAL 2 TIMES DAILY
Qty: 60 TABLET | Refills: 5 | Status: SHIPPED | OUTPATIENT
Start: 2025-08-19

## 2025-08-19 RX ORDER — ALLOPURINOL 100 MG/1
100 TABLET ORAL DAILY
Qty: 90 TABLET | Refills: 3 | Status: SHIPPED | OUTPATIENT
Start: 2025-08-19

## 2025-08-19 RX ORDER — FAMOTIDINE 20 MG/1
20 TABLET, FILM COATED ORAL 2 TIMES DAILY
Qty: 60 TABLET | Refills: 3 | Status: SHIPPED | OUTPATIENT
Start: 2025-08-19

## 2025-08-29 DIAGNOSIS — E78.2 MIXED HYPERLIPIDEMIA: ICD-10-CM

## 2025-08-29 RX ORDER — ATORVASTATIN CALCIUM 80 MG/1
80 TABLET, FILM COATED ORAL NIGHTLY
Qty: 90 TABLET | Refills: 3 | Status: SHIPPED | OUTPATIENT
Start: 2025-08-29